# Patient Record
Sex: FEMALE | Race: WHITE | Employment: FULL TIME | URBAN - METROPOLITAN AREA
[De-identification: names, ages, dates, MRNs, and addresses within clinical notes are randomized per-mention and may not be internally consistent; named-entity substitution may affect disease eponyms.]

---

## 2018-11-16 ENCOUNTER — HOSPITAL ENCOUNTER (OUTPATIENT)
Age: 33
Discharge: HOME | End: 2018-11-18
Payer: COMMERCIAL

## 2018-11-16 PROCEDURE — 88305 TISSUE EXAM BY PATHOLOGIST: CPT

## 2018-12-18 ENCOUNTER — APPOINTMENT (OUTPATIENT)
Dept: GENERAL RADIOLOGY | Age: 33
End: 2018-12-18
Payer: MEDICARE

## 2018-12-18 ENCOUNTER — HOSPITAL ENCOUNTER (EMERGENCY)
Age: 33
Discharge: HOME | End: 2018-12-18
Payer: MEDICARE

## 2018-12-18 ENCOUNTER — APPOINTMENT (OUTPATIENT)
Dept: CT IMAGING | Age: 33
End: 2018-12-18
Payer: MEDICARE

## 2018-12-18 VITALS
TEMPERATURE: 98.7 F | BODY MASS INDEX: 39.99 KG/M2 | RESPIRATION RATE: 16 BRPM | HEART RATE: 65 BPM | OXYGEN SATURATION: 100 % | WEIGHT: 240 LBS | HEIGHT: 65 IN | SYSTOLIC BLOOD PRESSURE: 107 MMHG | DIASTOLIC BLOOD PRESSURE: 81 MMHG

## 2018-12-18 DIAGNOSIS — S73.102A HIP SPRAIN, LEFT, INITIAL ENCOUNTER: ICD-10-CM

## 2018-12-18 DIAGNOSIS — R07.89 LEFT-SIDED CHEST WALL PAIN: ICD-10-CM

## 2018-12-18 DIAGNOSIS — S16.1XXA STRAIN OF NECK MUSCLE, INITIAL ENCOUNTER: ICD-10-CM

## 2018-12-18 DIAGNOSIS — S09.90XA CLOSED HEAD INJURY, INITIAL ENCOUNTER: ICD-10-CM
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
## 2018-12-18 DIAGNOSIS — S43.402A SPRAIN OF LEFT SHOULDER, UNSPECIFIED SHOULDER SPRAIN TYPE, INITIAL ENCOUNTER: ICD-10-CM

## 2018-12-18 PROCEDURE — 72125 CT NECK SPINE W/O DYE: CPT

## 2018-12-18 PROCEDURE — 71101 X-RAY EXAM UNILAT RIBS/CHEST: CPT

## 2018-12-18 PROCEDURE — 73502 X-RAY EXAM HIP UNI 2-3 VIEWS: CPT

## 2018-12-18 PROCEDURE — 99285 EMERGENCY DEPT VISIT HI MDM: CPT

## 2018-12-18 PROCEDURE — 6370000000 HC RX 637 (ALT 250 FOR IP): Performed by: PHYSICIAN ASSISTANT

## 2018-12-18 PROCEDURE — 73552 X-RAY EXAM OF FEMUR 2/>: CPT

## 2018-12-18 PROCEDURE — 73030 X-RAY EXAM OF SHOULDER: CPT

## 2018-12-18 PROCEDURE — 70450 CT HEAD/BRAIN W/O DYE: CPT

## 2018-12-18 RX ORDER — NAPROXEN 500 MG/1
500 TABLET ORAL 2 TIMES DAILY
Qty: 30 TABLET | Refills: 0 | Status: SHIPPED | OUTPATIENT
Start: 2018-12-18 | End: 2021-02-25

## 2018-12-18 RX ORDER — LORAZEPAM 1 MG/1
1 TABLET ORAL ONCE
Status: COMPLETED | OUTPATIENT
Start: 2018-12-18 | End: 2018-12-18

## 2018-12-18 RX ORDER — CYCLOBENZAPRINE HCL 10 MG
10 TABLET ORAL NIGHTLY PRN
Qty: 20 TABLET | Refills: 0 | Status: SHIPPED | OUTPATIENT
Start: 2018-12-18 | End: 2018-12-28

## 2018-12-18 RX ADMIN — LORAZEPAM 1 MG: 1 TABLET ORAL at 14:24

## 2018-12-18 ASSESSMENT — PAIN DESCRIPTION - LOCATION: LOCATION: HEAD

## 2018-12-18 ASSESSMENT — PAIN SCALES - GENERAL: PAINLEVEL_OUTOF10: 8
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
## 2019-04-05 ENCOUNTER — HOSPITAL ENCOUNTER (EMERGENCY)
Age: 34
Discharge: HOME | End: 2019-04-05
Payer: MEDICARE

## 2019-04-05 VITALS
SYSTOLIC BLOOD PRESSURE: 120 MMHG | HEIGHT: 64 IN | DIASTOLIC BLOOD PRESSURE: 82 MMHG | HEART RATE: 88 BPM | TEMPERATURE: 98.2 F | WEIGHT: 237 LBS | RESPIRATION RATE: 14 BRPM | BODY MASS INDEX: 40.46 KG/M2 | OXYGEN SATURATION: 98 %

## 2019-04-05 DIAGNOSIS — R11.2 NAUSEA VOMITING AND DIARRHEA: Primary | ICD-10-CM

## 2019-04-05 DIAGNOSIS — R19.7 NAUSEA VOMITING AND DIARRHEA: Primary | ICD-10-CM

## 2019-04-05 LAB
ALBUMIN SERPL-MCNC: 4.4 G/DL (ref 3.5–5.2)
ALP BLD-CCNC: 86 U/L (ref 35–104)
ALT SERPL-CCNC: 16 U/L (ref 0–32)
ANION GAP SERPL CALCULATED.3IONS-SCNC: 14 MMOL/L (ref 7–16)
AST SERPL-CCNC: 30 U/L (ref 0–31)
BACTERIA: ABNORMAL /HPF
BASOPHILS ABSOLUTE: 0.02 E9/L (ref 0–0.2)
BASOPHILS RELATIVE PERCENT: 0.2 % (ref 0–2)
BILIRUB SERPL-MCNC: 0.8 MG/DL (ref 0–1.2)
BILIRUBIN URINE: NEGATIVE
BLOOD, URINE: NEGATIVE
BUN BLDV-MCNC: 11 MG/DL (ref 6–20)
CALCIUM SERPL-MCNC: 9.2 MG/DL (ref 8.6–10.2)
CHLORIDE BLD-SCNC: 101 MMOL/L (ref 98–107)
CLARITY: CLEAR
CO2: 25 MMOL/L (ref 22–29)
COLOR: YELLOW
CREAT SERPL-MCNC: 0.8 MG/DL (ref 0.5–1)
EOSINOPHILS ABSOLUTE: 0.11 E9/L (ref 0.05–0.5)
EOSINOPHILS RELATIVE PERCENT: 1.2 % (ref 0–6)
EPITHELIAL CELLS, UA: ABNORMAL /HPF
GFR AFRICAN AMERICAN: >60
GFR NON-AFRICAN AMERICAN: >60 ML/MIN/1.73
GLUCOSE BLD-MCNC: 101 MG/DL (ref 74–99)
GLUCOSE URINE: NEGATIVE MG/DL
HCG, URINE, POC: NEGATIVE
HCT VFR BLD CALC: 42.3 % (ref 34–48)
HEMOGLOBIN: 14 G/DL (ref 11.5–15.5)
IMMATURE GRANULOCYTES #: 0.03 E9/L
IMMATURE GRANULOCYTES %: 0.3 % (ref 0–5)
KETONES, URINE: NEGATIVE MG/DL
LACTIC ACID: 1.7 MMOL/L (ref 0.5–2.2)
LEUKOCYTE ESTERASE, URINE: ABNORMAL
LIPASE: 22 U/L (ref 13–60)
LYMPHOCYTES ABSOLUTE: 0.66 E9/L (ref 1.5–4)
LYMPHOCYTES RELATIVE PERCENT: 7.5 % (ref 20–42)
Lab: NORMAL
MCH RBC QN AUTO: 27.4 PG (ref 26–35)
MCHC RBC AUTO-ENTMCNC: 33.1 % (ref 32–34.5)
MCV RBC AUTO: 82.8 FL (ref 80–99.9)
MONOCYTES ABSOLUTE: 0.29 E9/L (ref 0.1–0.95)
MONOCYTES RELATIVE PERCENT: 3.3 % (ref 2–12)
NEGATIVE QC PASS/FAIL: NORMAL
NEUTROPHILS ABSOLUTE: 7.73 E9/L (ref 1.8–7.3)
NEUTROPHILS RELATIVE PERCENT: 87.5 % (ref 43–80)
NITRITE, URINE: NEGATIVE
PDW BLD-RTO: 12.9 FL (ref 11.5–15)
PH UA: 8.5 (ref 5–9)
PLATELET # BLD: 208 E9/L (ref 130–450)
PMV BLD AUTO: 10.8 FL (ref 7–12)
POSITIVE QC PASS/FAIL: NORMAL
POTASSIUM SERPL-SCNC: 5 MMOL/L (ref 3.5–5)
PROTEIN UA: NEGATIVE MG/DL
RBC # BLD: 5.11 E12/L (ref 3.5–5.5)
RBC UA: ABNORMAL /HPF (ref 0–2)
SODIUM BLD-SCNC: 140 MMOL/L (ref 132–146)
SPECIFIC GRAVITY UA: 1.01 (ref 1–1.03)
TOTAL PROTEIN: 7.8 G/DL (ref 6.4–8.3)
UROBILINOGEN, URINE: 0.2 E.U./DL
WBC # BLD: 8.8 E9/L (ref 4.5–11.5)
WBC UA: ABNORMAL /HPF (ref 0–5)

## 2019-04-05 PROCEDURE — 85025 COMPLETE CBC W/AUTO DIFF WBC: CPT

## 2019-04-05 PROCEDURE — 36415 COLL VENOUS BLD VENIPUNCTURE: CPT

## 2019-04-05 PROCEDURE — 96375 TX/PRO/DX INJ NEW DRUG ADDON: CPT

## 2019-04-05 PROCEDURE — 2500000003 HC RX 250 WO HCPCS: Performed by: NURSE PRACTITIONER

## 2019-04-05 PROCEDURE — 2580000003 HC RX 258: Performed by: NURSE PRACTITIONER

## 2019-04-05 PROCEDURE — 81001 URINALYSIS AUTO W/SCOPE: CPT

## 2019-04-05 PROCEDURE — 83690 ASSAY OF LIPASE: CPT

## 2019-04-05 PROCEDURE — 6360000002 HC RX W HCPCS: Performed by: NURSE PRACTITIONER

## 2019-04-05 PROCEDURE — 99284 EMERGENCY DEPT VISIT MOD MDM: CPT

## 2019-04-05 PROCEDURE — 83605 ASSAY OF LACTIC ACID: CPT

## 2019-04-05 PROCEDURE — 80053 COMPREHEN METABOLIC PANEL: CPT

## 2019-04-05 PROCEDURE — 96374 THER/PROPH/DIAG INJ IV PUSH: CPT

## 2019-04-05 RX ORDER — 0.9 % SODIUM CHLORIDE 0.9 %
1000 INTRAVENOUS SOLUTION INTRAVENOUS ONCE
Status: COMPLETED | OUTPATIENT
Start: 2019-04-05 | End: 2019-04-05

## 2019-04-05 RX ORDER — DICYCLOMINE HYDROCHLORIDE 10 MG/1
20 CAPSULE ORAL 4 TIMES DAILY PRN
Qty: 20 CAPSULE | Refills: 0 | Status: SHIPPED | OUTPATIENT
Start: 2019-04-05 | End: 2021-02-25

## 2019-04-05 RX ORDER — ONDANSETRON 2 MG/ML
4 INJECTION INTRAMUSCULAR; INTRAVENOUS ONCE
Status: COMPLETED | OUTPATIENT
Start: 2019-04-05 | End: 2019-04-05

## 2019-04-05 RX ORDER — ONDANSETRON 4 MG/1
4 TABLET, ORALLY DISINTEGRATING ORAL EVERY 8 HOURS PRN
Qty: 10 TABLET | Refills: 0 | Status: SHIPPED | OUTPATIENT
Start: 2019-04-05 | End: 2021-02-25

## 2019-04-05 RX ADMIN — SODIUM CHLORIDE 1000 ML: 9 INJECTION, SOLUTION INTRAVENOUS at 12:22

## 2019-04-05 RX ADMIN — FAMOTIDINE 20 MG: 10 INJECTION, SOLUTION INTRAVENOUS at 12:23

## 2019-04-05 RX ADMIN — ONDANSETRON HYDROCHLORIDE 4 MG: 2 SOLUTION INTRAMUSCULAR; INTRAVENOUS at 12:24

## 2019-04-05 NOTE — ED PROVIDER NOTES
Independent [facility]    Department of Emergency Medicine   ED  Provider Note  Admit Date/RoomTime: 4/5/2019 11:07 AM  ED Room: 37/37  MRN: [id number]  Chief Complaint       Emesis (started this AM); Diarrhea; and Fatigue    History of Present Illness   Source of history provided by:  patient. History/Exam Limitations: none. Davi Chambers is a 36 y.o. old female who has a past medical history of:   Past Medical History:   Diagnosis Date    Anxiety     Depression     Pulmonary embolism ([address]     again 25    presents to the emergency department by private vehicle, for complaints of sudden onset burning, sharp pain in the epigastrium and in the periumbilical area without radiation which began acutely at 0400 this morning. There has been no similar episodes in the past .  Since onset the symptoms have been worsening. The pain is associated with chills, diarrhea and vomiting. The pain is aggravated by laying flat/supine and relieved by nothing. There has been NO back pain, cloudy urine, dysuria, headache or vaginal discharge. She also denies chest pain, shortness of breath, dizziness, and syncope. Patient states she last ate chicken that she made at home approximately 10 hours prior to acute onset of symptoms. Patient is extremely tearful and did not take any OTC medications for her symptoms. ROS   Pertinent positives and negatives are stated within HPI, all other systems reviewed and are negative. History reviewed. No pertinent surgical history. Social History:  reports that she has never smoked. She has never used smokeless tobacco. She reports that she does not drink alcohol or use drugs. Family History: family history is not on file. Allergies: Patient has no known allergies.     Physical Exam           ED Triage Vitals   BP Temp Temp src Pulse Resp SpO2 Height Weight   04/05/19 1105 04/05/19 1041 -- 04/05/19 1041 04/05/19 1105 04/05/19 1041 04/05/19 1105 04/05/19 1105   116/87 97.6 °F (36.4 °C)  93 12 97 % 5' 4\" (1.626 m) 237 lb (107.5 kg)      Oxygen Saturation Interpretation: Normal.    · General Appearance/Constitutional:  Alert, development consistent with age. · HEENT:  NC/NT. PERRLA. Airway patent. · Neck:  Supple. No lymphadenopathy. · Respiratory:  No retractions. Lungs Clear to auscultation and breath sounds equal.  · CV:  Regular rate and rhythm. · GI:  General Appearance: Obese, no abd scars. Bowel sounds: normal bowel sounds. Distension:  None. Tenderness: no rebound tenderness, no guarding. There is pain in the epigastric and periumbilical regions, but there is no increased pain on palpation of these or other areas of the abdomen. Liver: non-palpable and non-tender. Spleen:  non-palpable and non-tender. Pulsatile Mass: absent. Hernia:  no inguinal or femoral hernias noted. · Back: CVA Tenderness: No.   Integument:  Normal turgor. Warm, dry, without visible rash, unless noted elsewhere. · Lymphatics: No edema, cap.refill <3sec. Neurological:  GCS 15, Face is symmetric (VII), EOMs are intact (III, IV, VI), pupils are equal and reactive (II, III), tongue is midline (XII). The patient is walking in a smooth balanced and coordinated fashion w/o bumping or walking into anything (vision), talking w/ appropriate content and fluency, is fully attentive, and provided a spontaneous coherent history.       Lab / Imaging Results   (All laboratory and radiology results have been personally reviewed by myself)  Labs:  Results for orders placed or performed during the hospital encounter of 04/05/19   CBC Auto Differential   Result Value Ref Range    WBC 8.8 4.5 - 11.5 E9/L    RBC 5.11 3.50 - 5.50 E12/L    Hemoglobin 14.0 11.5 - 15.5 g/dL    Hematocrit 42.3 34.0 - 48.0 %    MCV 82.8 80.0 - 99.9 fL    MCH 27.4 26.0 - 35.0 pg    MCHC 33.1 32.0 - 34.5 %    RDW 12.9 11.5 - 15.0 fL    Platelets 765 501 - 903 E9/L    MPV 10.8 7.0 - 12.0 fL    Neutrophils % 87.5 (H) 43.0 - 80.0 %    Immature Granulocytes % 0.3 0.0 - 5.0 %    Lymphocytes % 7.5 (L) 20.0 - 42.0 %    Monocytes % 3.3 2.0 - 12.0 %    Eosinophils % 1.2 0.0 - 6.0 %    Basophils % 0.2 0.0 - 2.0 %    Neutrophils # 7.73 (H) 1.80 - 7.30 E9/L    Immature Granulocytes # 0.03 E9/L    Lymphocytes # 0.66 (L) 1.50 - 4.00 E9/L    Monocytes # 0.29 0.10 - 0.95 E9/L    Eosinophils # 0.11 0.05 - 0.50 E9/L    Basophils # 0.02 0.00 - 0.20 E9/L   Comprehensive Metabolic Panel   Result Value Ref Range    Sodium 140 132 - 146 mmol/L    Potassium 5.0 3.5 - 5.0 mmol/L    Chloride 101 98 - 107 mmol/L    CO2 25 22 - 29 mmol/L    Anion Gap 14 7 - 16 mmol/L    Glucose 101 (H) 74 - 99 mg/dL    BUN 11 6 - 20 mg/dL    CREATININE 0.8 0.5 - 1.0 mg/dL    GFR Non-African American >60 >=60 mL/min/1.73    GFR African American >60     Calcium 9.2 8.6 - 10.2 mg/dL    Total Protein 7.8 6.4 - 8.3 g/dL    Alb 4.4 3.5 - 5.2 g/dL    Total Bilirubin 0.8 0.0 - 1.2 mg/dL    Alkaline Phosphatase 86 35 - 104 U/L    ALT 16 0 - 32 U/L    AST 30 0 - 31 U/L   Lactic Acid, Plasma   Result Value Ref Range    Lactic Acid 1.7 0.5 - 2.2 mmol/L   Lipase   Result Value Ref Range    Lipase 22 13 - 60 U/L   Urinalysis   Result Value Ref Range    Color, UA Yellow Straw/Yellow    Clarity, UA Clear Clear    Glucose, Ur Negative Negative mg/dL    Bilirubin Urine Negative Negative    Ketones, Urine Negative Negative mg/dL    Specific Gravity, UA 1.010 1.005 - 1.030    Blood, Urine Negative Negative    pH, UA 8.5 5.0 - 9.0    Protein, UA Negative Negative mg/dL    Urobilinogen, Urine 0.2 <2.0 E.U./dL    Nitrite, Urine Negative Negative    Leukocyte Esterase, Urine TRACE (A) Negative   Microscopic Urinalysis   Result Value Ref Range    WBC, UA 0-1 0 - 5 /HPF    RBC, UA NONE 0 - 2 /HPF    Epi Cells MODERATE /HPF    Bacteria, UA FEW (A) /HPF   POC Pregnancy Urine   Result Value Ref Range    HCG, Urine, POC Negative Negative    Lot Number [id number] Every effort was made to ensure accuracy; however, inadvertent computerized transcription errors may be present.   END OF ED PROVIDER NOTE     Alma Lara, APRN - FAISAL  04/05/19 7770

## 2019-04-05 NOTE — ED NOTES
Bed: 37  Expected date:   Expected time:   Means of arrival:   Comments:  Franklin Hdez, JESUS  04/05/19 8851

## 2021-02-20 ENCOUNTER — APPOINTMENT (OUTPATIENT)
Dept: GENERAL RADIOLOGY | Age: 36
End: 2021-02-20
Payer: MEDICAID

## 2021-02-20 ENCOUNTER — HOSPITAL ENCOUNTER (EMERGENCY)
Age: 36
Discharge: HOME | End: 2021-02-20
Payer: MEDICAID

## 2021-02-20 VITALS
WEIGHT: 237 LBS | DIASTOLIC BLOOD PRESSURE: 70 MMHG | BODY MASS INDEX: 40.68 KG/M2 | OXYGEN SATURATION: 98 % | TEMPERATURE: 97.8 F | SYSTOLIC BLOOD PRESSURE: 132 MMHG | RESPIRATION RATE: 17 BRPM | HEART RATE: 87 BPM

## 2021-02-20 DIAGNOSIS — S52.602A CLOSED FRACTURE OF DISTAL ENDS OF LEFT RADIUS AND ULNA, INITIAL ENCOUNTER: Primary | ICD-10-CM

## 2021-02-20 DIAGNOSIS — S52.502A CLOSED FRACTURE OF DISTAL ENDS OF LEFT RADIUS AND ULNA, INITIAL ENCOUNTER: Primary | ICD-10-CM

## 2021-02-20 PROCEDURE — 6370000000 HC RX 637 (ALT 250 FOR IP): Performed by: NURSE PRACTITIONER

## 2021-02-20 PROCEDURE — 73110 X-RAY EXAM OF WRIST: CPT

## 2021-02-20 PROCEDURE — 2500000003 HC RX 250 WO HCPCS: Performed by: NURSE PRACTITIONER

## 2021-02-20 PROCEDURE — 96372 THER/PROPH/DIAG INJ SC/IM: CPT

## 2021-02-20 PROCEDURE — 6370000000 HC RX 637 (ALT 250 FOR IP): Performed by: PHYSICIAN ASSISTANT

## 2021-02-20 PROCEDURE — 6360000002 HC RX W HCPCS: Performed by: STUDENT IN AN ORGANIZED HEALTH CARE EDUCATION/TRAINING PROGRAM

## 2021-02-20 PROCEDURE — 99285 EMERGENCY DEPT VISIT HI MDM: CPT

## 2021-02-20 PROCEDURE — 73130 X-RAY EXAM OF HAND: CPT

## 2021-02-20 RX ORDER — FENTANYL CITRATE 50 UG/ML
50 INJECTION, SOLUTION INTRAMUSCULAR; INTRAVENOUS ONCE
Status: COMPLETED | OUTPATIENT
Start: 2021-02-20 | End: 2021-02-20

## 2021-02-20 RX ORDER — MORPHINE SULFATE 4 MG/ML
8 INJECTION, SOLUTION INTRAMUSCULAR; INTRAVENOUS ONCE
Status: DISCONTINUED | OUTPATIENT
Start: 2021-02-20 | End: 2021-02-20

## 2021-02-20 RX ORDER — ONDANSETRON 4 MG/1
4 TABLET, ORALLY DISINTEGRATING ORAL ONCE
Status: DISCONTINUED | OUTPATIENT
Start: 2021-02-20 | End: 2021-02-21 | Stop reason: HOSPADM

## 2021-02-20 RX ORDER — LIDOCAINE HYDROCHLORIDE 10 MG/ML
20 INJECTION, SOLUTION INFILTRATION; PERINEURAL ONCE
Status: COMPLETED | OUTPATIENT
Start: 2021-02-20 | End: 2021-02-20

## 2021-02-20 RX ORDER — OXYCODONE HYDROCHLORIDE AND ACETAMINOPHEN 5; 325 MG/1; MG/1
1 TABLET ORAL EVERY 8 HOURS PRN
Qty: 18 TABLET | Refills: 0 | Status: SHIPPED | OUTPATIENT
Start: 2021-02-20 | End: 2021-02-23

## 2021-02-20 RX ORDER — OXYCODONE HYDROCHLORIDE AND ACETAMINOPHEN 5; 325 MG/1; MG/1
1 TABLET ORAL ONCE
Status: COMPLETED | OUTPATIENT
Start: 2021-02-20 | End: 2021-02-20

## 2021-02-20 RX ADMIN — OXYCODONE AND ACETAMINOPHEN 1 TABLET: 5; 325 TABLET ORAL at 21:46

## 2021-02-20 RX ADMIN — OXYCODONE AND ACETAMINOPHEN 1 TABLET: 5; 325 TABLET ORAL at 19:15

## 2021-02-20 RX ADMIN — LIDOCAINE HYDROCHLORIDE 20 ML: 10 INJECTION, SOLUTION INFILTRATION; PERINEURAL at 20:44

## 2021-02-20 RX ADMIN — FENTANYL CITRATE 50 MCG: 50 INJECTION, SOLUTION INTRAMUSCULAR; INTRAVENOUS at 20:24

## 2021-02-20 ASSESSMENT — PAIN SCALES - GENERAL
PAINLEVEL_OUTOF10: 7
PAINLEVEL_OUTOF10: 6
PAINLEVEL_OUTOF10: 10

## 2021-02-21 NOTE — CONSULTS
Department of Orthopedic Surgery  Resident Consult Note          Reason for Consult:   Left Wrist Pain    HISTORY OF PRESENT ILLNESS:       Patient is a 30 y.o. female who presents with wrist pain after fall while sledding. Patient is right-hand dominant female. She states that after she fell she had wrist pain that was not severe until she started to try to move it. She does endorse some tingling to the distal thumb. All of the other fingers sensation is intact per her account. Patient states it was more painful after they moved her for xray. She endorses radiating pain up into her elbow into her shoulder. She has no pain with movement of the elbow. Denies any other pains elsewhere. Denies any other orthopedic complaints at this time. Of note, she is right had dominant and works in a . She is active. Past Medical History:        Diagnosis Date    Anxiety     Depression     Pulmonary embolism ([facility] [address]     again 25     Past Surgical History:    History reviewed. No pertinent surgical history. Current Medications:   Current Facility-Administered Medications: ondansetron (ZOFRAN-ODT) disintegrating tablet 4 mg, 4 mg, Oral, Once  lidocaine 1 % injection 20 mL, 20 mL, Intradermal, Once  fentaNYL (SUBLIMAZE) injection 50 mcg, 50 mcg, Intramuscular, Once  Allergies:  Patient has no known allergies. Social History:   TOBACCO:   reports that she has never smoked. She has never used smokeless tobacco.  ETOH:   reports no history of alcohol use. DRUGS:   reports no history of drug use. ACTIVITIES OF DAILY LIVING:    OCCUPATION:    Family History:   History reviewed. No pertinent family history.     REVIEW OF SYSTEMS:  CONSTITUTIONAL:  negative for  fevers, chills  EYES:  negative for acute vision changes  HEENT:  negative for acute hearing changes  RESPIRATORY:  negative for SOB  CARDIOVASCULAR:  negative for acute chest pains  GASTROINTESTINAL:  negative for nausea, vomiting  HEMATOLOGIC/LYMPHATIC: is an acute intra-articular distal radius fracture with mild dorsal angulation. There is an associated ulnar styloid fracture. No carpal bone fractures noted. Mild DRUJ widening on these projections. IMPRESSION:  ·  Closed, Left Distal Radius Fx    PLAN:  After informed consent was verbally obtained. Distal radius underwent closed reduction with application of well-padded sugar tong splint, neurovascular status was unchanged. Currently no concern for acute carpal tunnel syndrome. Non-weight bearing to Left Upper Extermity  Post splinting xrays  Pain medication and Vitamin C  Strict Ice and elevation to  leftUE  Signs and symptoms of worsening or emergent care explained at length to the patient. She will return if symptoms worsen. Surgical expectations discussed with patient. Follow up as soon as possible with ortho trauma. Call for appointment. · Discuss with Dr. Kee Treadwell.

## 2021-02-21 NOTE — ED NOTES
Pt alert and oriented x4. Speech clear. Respirations easy/unlabored. Skin warm/dry. Appropriate color. No signs of acute distress noted. Pt/family teaching provided; verbalized understanding. Pt stable for discharge.        Zelda Buchanan RN  02/20/21 5101

## 2021-02-22 ENCOUNTER — TELEPHONE (OUTPATIENT)
Dept: ADMINISTRATIVE | Age: 36
End: 2021-02-22

## 2021-02-22 ENCOUNTER — TELEPHONE (OUTPATIENT)
Dept: ORTHOPEDIC SURGERY | Age: 36
End: 2021-02-22

## 2021-02-22 NOTE — TELEPHONE ENCOUNTER
Call placed to patient to schedule ED follow up visit. Appointment scheduled at this time. Directions provided to patient. Encouraged to call with further questions or concerns.     Future Appointments   Date Time Provider Emily Mclain   2/25/2021  8:15  [facility]

## 2021-02-22 NOTE — TELEPHONE ENCOUNTER
Patient seen at [facility] on 2/20 fror left wrist fracture. She was advised to call and make appointment to  f/u with Dr Caty Edwards  By 2/22. Please reach back with patient to schedule appointment.

## 2021-02-22 NOTE — TELEPHONE ENCOUNTER
----- Message from Heriberto Moeller PA-C sent at 2/22/2021  7:25 AM EST -----  Regarding:  This patient to be seen with [facility] this week  Mariolashahid Araiza  Female, 30 y.o., 1985  MRN:   [id number]    Left Distal Radius fracture-- surgery set up

## 2021-02-25 ENCOUNTER — HOSPITAL ENCOUNTER (OUTPATIENT)
Age: 36
Discharge: HOME | End: 2021-02-27
Payer: MEDICAID

## 2021-02-25 ENCOUNTER — OFFICE VISIT (OUTPATIENT)
Dept: ORTHOPEDIC SURGERY | Age: 36
End: 2021-02-25
Payer: MEDICAID

## 2021-02-25 VITALS — TEMPERATURE: 98.2 F

## 2021-02-25 DIAGNOSIS — Z11.59 SCREENING FOR VIRAL DISEASE: ICD-10-CM

## 2021-02-25 DIAGNOSIS — S52.532A CLOSED COLLES' FRACTURE OF LEFT RADIUS, INITIAL ENCOUNTER: ICD-10-CM

## 2021-02-25 DIAGNOSIS — S52.572A OTHER CLOSED INTRA-ARTICULAR FRACTURE OF DISTAL END OF LEFT RADIUS, INITIAL ENCOUNTER: Primary | ICD-10-CM

## 2021-02-25 PROCEDURE — 99204 OFFICE O/P NEW MOD 45 MIN: CPT | Performed by: ORTHOPAEDIC SURGERY

## 2021-02-25 PROCEDURE — 99202 OFFICE O/P NEW SF 15 MIN: CPT

## 2021-02-25 PROCEDURE — 1036F TOBACCO NON-USER: CPT | Performed by: ORTHOPAEDIC SURGERY

## 2021-02-25 PROCEDURE — G8484 FLU IMMUNIZE NO ADMIN: HCPCS | Performed by: ORTHOPAEDIC SURGERY

## 2021-02-25 PROCEDURE — U0003 INFECTIOUS AGENT DETECTION BY NUCLEIC ACID (DNA OR RNA); SEVERE ACUTE RESPIRATORY SYNDROME CORONAVIRUS 2 (SARS-COV-2) (CORONAVIRUS DISEASE [COVID-19]), AMPLIFIED PROBE TECHNIQUE, MAKING USE OF HIGH THROUGHPUT TECHNOLOGIES AS DESCRIBED BY CMS-2020-01-R: HCPCS

## 2021-02-25 PROCEDURE — G8427 DOCREV CUR MEDS BY ELIG CLIN: HCPCS | Performed by: ORTHOPAEDIC SURGERY

## 2021-02-25 PROCEDURE — G8417 CALC BMI ABV UP PARAM F/U: HCPCS | Performed by: ORTHOPAEDIC SURGERY

## 2021-02-25 RX ORDER — OXYCODONE HYDROCHLORIDE AND ACETAMINOPHEN 5; 325 MG/1; MG/1
1 TABLET ORAL EVERY 6 HOURS PRN
COMMUNITY
End: 2021-02-25 | Stop reason: SDUPTHER

## 2021-02-25 RX ORDER — METHOCARBAMOL 750 MG/1
750 TABLET, FILM COATED ORAL 4 TIMES DAILY PRN
Qty: 40 TABLET | Refills: 0 | Status: SHIPPED
Start: 2021-02-25 | End: 2021-03-01 | Stop reason: ALTCHOICE

## 2021-02-25 RX ORDER — OXYCODONE HYDROCHLORIDE AND ACETAMINOPHEN 5; 325 MG/1; MG/1
1 TABLET ORAL EVERY 6 HOURS PRN
Qty: 28 TABLET | Refills: 0 | Status: ON HOLD
Start: 2021-03-01 | End: 2021-03-03 | Stop reason: HOSPADM

## 2021-02-25 RX ORDER — OLOPATADINE HYDROCHLORIDE 1 MG/ML
1 SOLUTION/ DROPS OPHTHALMIC DAILY
COMMUNITY
Start: 2020-11-27 | End: 2022-04-13 | Stop reason: ALTCHOICE

## 2021-02-25 RX ORDER — IBUPROFEN 200 MG
200 TABLET ORAL EVERY 6 HOURS PRN
COMMUNITY
End: 2022-03-13 | Stop reason: SDUPTHER

## 2021-02-25 NOTE — PROGRESS NOTES
Orthopaedic New Patient Note        Garrett León is a 30 y.o. female, her YOB: 1985 with the following history as recorded in [facility]:    Patient Active Problem List    Diagnosis Date Noted    Closed Colles' fracture of left radius 02/25/2021     Current Outpatient Medications   Medication Sig Dispense Refill    olopatadine (PATANOL) 0.1 % ophthalmic solution Place 1 drop into both eyes daily      ibuprofen (ADVIL;MOTRIN) 200 MG tablet Take 200 mg by mouth every 6 hours as needed for Pain (2 to 4 tabs Q6 PRN)      oxyCODONE-acetaminophen (PERCOCET) 5-325 MG per tablet Take 1 tablet by mouth every 6 hours as needed for Pain (PRN once daily due to work). No current facility-administered medications for this visit. Allergies: Patient has no known allergies. Past Medical History:   Diagnosis Date    Anxiety     Depression     Pulmonary embolism ([facility] [address]     again 25     History reviewed. No pertinent surgical history. History reviewed. No pertinent family history. Social History     Tobacco Use    Smoking status: Never Smoker    Smokeless tobacco: Never Used   Substance Use Topics    Alcohol use: No     Comment: social                           Review of Systems   Constitutional: Negative for fever and chills. HENT: Negative for ear pain, sore throat and sinus pressure. Eyes: Negative for pain, discharge and redness. Respiratory: Negative for cough, shortness of breath and wheezing. Cardiovascular: Negative for chest pain. Gastrointestinal: Negative for nausea, vomiting, diarrhea and abdominal distention. Genitourinary: Negative for dysuria and frequency. Musculoskeletal: Negative for back pain and arthralgias. All other systems reviewed and negative.     CC: Left wrist injury S: Shell Chance is a 30 y. o. who is here today for initial evaluation for her left wrist. DOI: 2/20/21, BEREKET: Mechanical fall. Patient is right-hand dominant. Was seen in ER on date of injury diagnosed with intra-articular left distal radius fracture, underwent closed reduction and sugar tong splint application. She presents today for initial follow-up. Did have some initial paresthesias to the hand. States these continue to be intermittent and denies numbness at this time. States she still has pain to the wrist, denies pain elsewhere. Denies any other injuries or regions of pain. She works at a  and continues to work. States she is not lifting anything at this time. Physical Exam  Temp 98.2 °F (36.8 °C) (Oral)   LMP 12/07/2020 Comment: very, very irregular  O:   CONSTITUTIONAL: awake, alert, cooperative, no apparent distress, and appears stated age  EYES: Lids and lashes normal, pupils equal, round and reactive to light, extra ocular muscles intact, sclera clear, conjunctiva normal  ENT: Normocephalic, without obvious abnormality, atraumatic, external ears without lesions, oral pharynx with moist mucus membranes  NECK: Trachea midline, skin normal  LUNGS: No increased work of breathing, good air exchange  CARDIOVASCULAR: 2+ pulses throughout and capillary Refill less than 2 seconds  ABDOMEN: soft, non-distended, non-tender and no rebound tenderness or guarding  NEUROLOGIC: Awake, alert, oriented to name, place and time. Cranial nerves II-XII are grossly intact. Motor is 5 out of 5 bilaterally. Sensory is intact.    Left Upper Extremity Exam:  Sugar tong splint is intact  Demonstrates good active ROM of digits, mild swelling to her fingers  No pain with passive ROM of fingers  Sensation intact to touch distally to all digits, states sensation intact to radial, ulnar, median nerve distributions to the hand  Fingers warm and perfused, brisk cap refill Demonstrates AIN, PIN, ulnar motor to the hand  No other tenderness about the arm to the shoulder elbow, pain focal to the wrist  Compartments supple throughout arm and forearm. Xrays Reviewed  Left distal radius fracture metadiaphyseal junction, initial dorsal angulation with comminuted, intra-articular extension, interval closed reduction    ASSESSMENT:    ICD-10-CM    1. Other closed intra-articular fracture of distal end of left radius, initial encounter  S52.572A        PLAN:   Had lengthy discussion with patient regarding their diagnosis, typical prognosis, and expected outcomes. We reviewed the possible complications from the injury itself despite treatment chosen. We also discussed treatment options including nonoperative managements versus surgical management, along with risks and benefits of each. Patient has elected for surgical management despite associated risks. OR Wednesday, 3/3/2021 for left distal radius intra-articular fracture open reduction internal fixation  Patient to maintain sugar tong splint, elevation, nonweightbearing affected extremity  PAT and Covid testing per protocol. I have explained the risks and complications of the recommended surgery with the patient at length, as well as discussed potential treatment alternatives including nonoperative management. These risks include but are not limited to death or complication from anesthesia, continued pain, stiffness, nerve tendon or vascular injury, infection, nonunion or malunion, symptomatic hardware or hardware failure, deep vein thrombosis or pulmonary embolism, unforeseen complications, and need for further surgery, etc.  Patient understood this, asked appropriate questions, which were all answered, and she has elected to proceed with the procedure.     Electronically Signed By  Kenneth Vences DO  2/25/21 NOTE: This report was transcribed using voice recognition software.  Every effort was made to ensure accuracy; however, inadvertent computerized transcription errors may be present

## 2021-02-25 NOTE — PROGRESS NOTES
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 Presents with sling and ED splint in good repair. Denies new injury or fall.

## 2021-02-25 NOTE — PATIENT INSTRUCTIONS
1. Your surgery is scheduled for Left Distal Radius Open Reduction with Internal Fixation on 3/3/2021 at 8:30 AM with Dr. Kenneth Vences, DO at the main [id number]  [address] on [facility] in [facility] . You will need to report to Preop area  that morning at 6:30 AM    2. You are having Outpatient surgery so you will be returning home the same day  3. Preadmission Testing (PAT) department at [facility] will contact you with all the details prior to surgery. 4. Nothing to eat or drink after midnight the night before surgery. You may take a pain pill and any other medicine PAT instructs you to take with small sip of water if needed. 5. Keep splint clean and dry. Do not remove or get wet. 6. Continue with ice and elevation to reduce swelling  7. No weight bearing left upper extremity, use assistive devices  8. Take pain medicine as instructed  9. Call office with any question or concerns: [phone]  28. Hold ASA the day of surgery. Hold all NSAIDs 3 days prior to surgery    All surgical patients will be gradually tapered off narcotic pain medication post operatively, this office will not continue narcotics longer than 6 weeks from date of surgery. If narcotics are required longer than this time period without objective finding you will be referred to pain management. Open Reduction With Internal Fixation for Limb Fracture: Before Your Surgery    What is open reduction with internal fixation? Open reduction with internal fixation is a type of surgery to fix a broken (fractured) bone. The doctor makes a cut, called an incision, in the skin over the bone. The doctor then moves the pieces of bone back into the normal position. This is called open reduction. The doctor may use special screws, pins, plates, or rods to hold the bone in place while it heals. This is called internal fixation. These devices may stay in your body from now on. The doctor closes the incision with stitches. You will have a scar, but it will fade with time. You may spend from a few hours to a few days in the hospital. This depends on how serious your injury is. It usually takes 6 to 12 weeks for a broken bone to heal.    How soon you can go back to work and your normal routine depends on your job. It also depends on how long it takes your bone to heal. For example, if you have a broken leg and you sit at work, you may be able to go back in 1 to 2 weeks. But if your job requires you to walk or stand a lot, you may need to wait until your bone has healed. OUTPATIENT ORTHOPEDIC SURGERY  PRE-OP COVID TESTING    ? We need to have COVID-19 Testing done 4 days (not including Sunday) prior to your scheduled surgery    ? After your testing is completed you will need to Self Quarantine until date of surgery    ? If your surgery is scheduled for:  Wednesday- Testing needs to be done Friday    Locations and hours are listed below: These sites will not test anyone without a physician order. The order can be in [facility] or can be a paper order. ? [facility]. Hours of Operation  Monday  Friday 6:00am  2:30pm.   ? [address] [facility], [address] [place] [address].  Hours of Operation  Monday  Friday 6:00am  2:30pm. ( [place] [id number]) ? [address] [facility], [place], [address] [place] [address]. Hours of Operation  Monday  Friday 6:00am  2:30pm.     ? You will follow white signs that say SURGERY TESTING  ? Please bring a valid photo ID with you  ? Please bring order for COVID 19 test with you  ?  Pre-Admission Testing will also contact you to review COVID 19 testing and protocol

## 2021-02-26 ENCOUNTER — PREP FOR PROCEDURE (OUTPATIENT)
Dept: ORTHOPEDIC SURGERY | Age: 36
End: 2021-02-26

## 2021-02-26 RX ORDER — SODIUM CHLORIDE, SODIUM LACTATE, POTASSIUM CHLORIDE, CALCIUM CHLORIDE 600; 310; 30; 20 MG/100ML; MG/100ML; MG/100ML; MG/100ML
INJECTION, SOLUTION INTRAVENOUS CONTINUOUS
Status: CANCELLED | OUTPATIENT
Start: 2021-02-26

## 2021-02-26 RX ORDER — SODIUM CHLORIDE 0.9 % (FLUSH) 0.9 %
10 SYRINGE (ML) INJECTION PRN
Status: CANCELLED | OUTPATIENT
Start: 2021-02-26

## 2021-02-26 RX ORDER — SODIUM CHLORIDE 0.9 % (FLUSH) 0.9 %
10 SYRINGE (ML) INJECTION EVERY 12 HOURS SCHEDULED
Status: CANCELLED | OUTPATIENT
Start: 2021-02-26

## 2021-02-26 NOTE — H&P
Orthopaedic New Patient Note         Carie Marin is a 30 y.o. female, her YOB: 1985 with the following history as recorded in [facility]:          Patient Active Problem List     Diagnosis Date Noted    Closed Colles' fracture of left radius 02/25/2021      Current Facility-Administered Medications          Current Outpatient Medications   Medication Sig Dispense Refill    olopatadine (PATANOL) 0.1 % ophthalmic solution Place 1 drop into both eyes daily        ibuprofen (ADVIL;MOTRIN) 200 MG tablet Take 200 mg by mouth every 6 hours as needed for Pain (2 to 4 tabs Q6 PRN)        oxyCODONE-acetaminophen (PERCOCET) 5-325 MG per tablet Take 1 tablet by mouth every 6 hours as needed for Pain (PRN once daily due to work).          No current facility-administered medications for this visit.          Allergies: Patient has no known allergies. Past Medical History        Past Medical History:   Diagnosis Date    Anxiety      Depression      Pulmonary embolism ([facility] [address]       again 25         Past Surgical History   History reviewed. No pertinent surgical history. Family History   History reviewed. No pertinent family history. Social History            Tobacco Use    Smoking status: Never Smoker    Smokeless tobacco: Never Used   Substance Use Topics    Alcohol use: No       Comment: social                            Review of Systems   Constitutional: Negative for fever and chills. HENT: Negative for ear pain, sore throat and sinus pressure. Eyes: Negative for pain, discharge and redness. Respiratory: Negative for cough, shortness of breath and wheezing. Cardiovascular: Negative for chest pain. Gastrointestinal: Negative for nausea, vomiting, diarrhea and abdominal distention. Genitourinary: Negative for dysuria and frequency. Musculoskeletal: Negative for back pain and arthralgias.    All other systems reviewed and negative.     CC: Left wrist injury     S: Carie Marin is a 30 y. o. who is here today for initial evaluation for her left wrist. DOI: 2/20/21, BEREKET: Mechanical fall. Patient is right-hand dominant. Was seen in ER on date of injury diagnosed with intra-articular left distal radius fracture, underwent closed reduction and sugar tong splint application. She presents today for initial follow-up. Did have some initial paresthesias to the hand. States these continue to be intermittent and denies numbness at this time. States she still has pain to the wrist, denies pain elsewhere. Denies any other injuries or regions of pain. She works at a  and continues to work. States she is not lifting anything at this time.     Physical Exam  Temp 98.2 °F (36.8 °C) (Oral)   LMP 12/07/2020 Comment: very, very irregular  O:   CONSTITUTIONAL: awake, alert, cooperative, no apparent distress, and appears stated age  EYES: Lids and lashes normal, pupils equal, round and reactive to light, extra ocular muscles intact, sclera clear, conjunctiva normal  ENT: Normocephalic, without obvious abnormality, atraumatic, external ears without lesions, oral pharynx with moist mucus membranes  NECK: Trachea midline, skin normal  LUNGS: No increased work of breathing, good air exchange  CARDIOVASCULAR: 2+ pulses throughout and capillary Refill less than 2 seconds  ABDOMEN: soft, non-distended, non-tender and no rebound tenderness or guarding  NEUROLOGIC: Awake, alert, oriented to name, place and time. Cranial nerves II-XII are grossly intact. Motor is 5 out of 5 bilaterally. Sensory is intact.    Left Upper Extremity Exam:  Sugar tong splint is intact  Demonstrates good active ROM of digits, mild swelling to her fingers  No pain with passive ROM of fingers  Sensation intact to touch distally to all digits, states sensation intact to radial, ulnar, median nerve distributions to the hand  Fingers warm and perfused, brisk cap refill  Demonstrates AIN, PIN, ulnar motor to the hand  No other tenderness about the arm to the shoulder elbow, pain focal to the wrist  Compartments supple throughout arm and forearm.     Xrays Reviewed  Left distal radius fracture metadiaphyseal junction, initial dorsal angulation with comminuted, intra-articular extension, interval closed reduction     ASSESSMENT:      ICD-10-CM     1. Other closed intra-articular fracture of distal end of left radius, initial encounter  S52.572A           PLAN:   Had lengthy discussion with patient regarding their diagnosis, typical prognosis, and expected outcomes. We reviewed the possible complications from the injury itself despite treatment chosen. We also discussed treatment options including nonoperative managements versus surgical management, along with risks and benefits of each. Patient has elected for surgical management despite associated risks. OR Wednesday, 3/3/2021 for left distal radius intra-articular fracture open reduction internal fixation  Patient to maintain sugar tong splint, elevation, nonweightbearing affected extremity  PAT and Covid testing per protocol. I have explained the risks and complications of the recommended surgery with the patient at length, as well as discussed potential treatment alternatives including nonoperative management.  These risks include but are not limited to death or complication from anesthesia, continued pain, stiffness, nerve tendon or vascular injury, infection, nonunion or malunion, symptomatic hardware or hardware failure, deep vein thrombosis or pulmonary embolism, unforeseen complications, and need for further surgery, etc.  Patient understood this, asked appropriate questions, which were all answered, and she has elected to proceed with the procedure.

## 2021-02-26 NOTE — H&P (VIEW-ONLY)
Orthopaedic New Patient Note         Yovany Muniz is a 30 y.o. female, her YOB: 1985 with the following history as recorded in [facility]:          Patient Active Problem List     Diagnosis Date Noted    Closed Colles' fracture of left radius 02/25/2021      Current Facility-Administered Medications          Current Outpatient Medications   Medication Sig Dispense Refill    olopatadine (PATANOL) 0.1 % ophthalmic solution Place 1 drop into both eyes daily        ibuprofen (ADVIL;MOTRIN) 200 MG tablet Take 200 mg by mouth every 6 hours as needed for Pain (2 to 4 tabs Q6 PRN)        oxyCODONE-acetaminophen (PERCOCET) 5-325 MG per tablet Take 1 tablet by mouth every 6 hours as needed for Pain (PRN once daily due to work).          No current facility-administered medications for this visit.          Allergies: Patient has no known allergies. Past Medical History        Past Medical History:   Diagnosis Date    Anxiety      Depression      Pulmonary embolism ([facility] [address]       again 25         Past Surgical History   History reviewed. No pertinent surgical history. Family History   History reviewed. No pertinent family history. Social History            Tobacco Use    Smoking status: Never Smoker    Smokeless tobacco: Never Used   Substance Use Topics    Alcohol use: No       Comment: social                            Review of Systems   Constitutional: Negative for fever and chills. HENT: Negative for ear pain, sore throat and sinus pressure. Eyes: Negative for pain, discharge and redness. Respiratory: Negative for cough, shortness of breath and wheezing. Cardiovascular: Negative for chest pain. Gastrointestinal: Negative for nausea, vomiting, diarrhea and abdominal distention. Genitourinary: Negative for dysuria and frequency. Musculoskeletal: Negative for back pain and arthralgias.    All other systems reviewed and negative.     CC: Left wrist injury    S: Yovany Muniz is a 30 y. o. who is here today for initial evaluation for her left wrist. DOI: 2/20/21, BEREKET: Mechanical fall. Patient is right-hand dominant. Was seen in ER on date of injury diagnosed with intra-articular left distal radius fracture, underwent closed reduction and sugar tong splint application. She presents today for initial follow-up. Did have some initial paresthesias to the hand. States these continue to be intermittent and denies numbness at this time. States she still has pain to the wrist, denies pain elsewhere. Denies any other injuries or regions of pain. She works at a  and continues to work. States she is not lifting anything at this time.     Physical Exam  Temp 98.2 °F (36.8 °C) (Oral)   LMP 12/07/2020 Comment: very, very irregular  O:   CONSTITUTIONAL: awake, alert, cooperative, no apparent distress, and appears stated age  EYES: Lids and lashes normal, pupils equal, round and reactive to light, extra ocular muscles intact, sclera clear, conjunctiva normal  ENT: Normocephalic, without obvious abnormality, atraumatic, external ears without lesions, oral pharynx with moist mucus membranes  NECK: Trachea midline, skin normal  LUNGS: No increased work of breathing, good air exchange  CARDIOVASCULAR: 2+ pulses throughout and capillary Refill less than 2 seconds  ABDOMEN: soft, non-distended, non-tender and no rebound tenderness or guarding  NEUROLOGIC: Awake, alert, oriented to name, place and time. Cranial nerves II-XII are grossly intact. Motor is 5 out of 5 bilaterally. Sensory is intact.    Left Upper Extremity Exam:  Sugar tong splint is intact  Demonstrates good active ROM of digits, mild swelling to her fingers  No pain with passive ROM of fingers  Sensation intact to touch distally to all digits, states sensation intact to radial, ulnar, median nerve distributions to the hand  Fingers warm and perfused, brisk cap refill Demonstrates AIN, PIN, ulnar motor to the hand  No other tenderness about the arm to the shoulder elbow, pain focal to the wrist  Compartments supple throughout arm and forearm.     Xrays Reviewed  Left distal radius fracture metadiaphyseal junction, initial dorsal angulation with comminuted, intra-articular extension, interval closed reduction     ASSESSMENT:      ICD-10-CM     1. Other closed intra-articular fracture of distal end of left radius, initial encounter  S52.572A           PLAN:   Had lengthy discussion with patient regarding their diagnosis, typical prognosis, and expected outcomes. We reviewed the possible complications from the injury itself despite treatment chosen. We also discussed treatment options including nonoperative managements versus surgical management, along with risks and benefits of each. Patient has elected for surgical management despite associated risks. OR Wednesday, 3/3/2021 for left distal radius intra-articular fracture open reduction internal fixation  Patient to maintain sugar tong splint, elevation, nonweightbearing affected extremity  PAT and Covid testing per protocol. I have explained the risks and complications of the recommended surgery with the patient at length, as well as discussed potential treatment alternatives including nonoperative management.  These risks include but are not limited to death or complication from anesthesia, continued pain, stiffness, nerve tendon or vascular injury, infection, nonunion or malunion, symptomatic hardware or hardware failure, deep vein thrombosis or pulmonary embolism, unforeseen complications, and need for further surgery, etc.  Patient understood this, asked appropriate questions, which were all answered, and she has elected to proceed with the procedure.

## 2021-02-27 LAB
SARS-COV-2: NOT DETECTED
SOURCE: NORMAL

## 2021-03-01 NOTE — PROGRESS NOTES
[address] PRE-ADMISSION TESTING GENERAL INSTRUCTIONS- [facility]-phone number:[phone]    GENERAL INSTRUCTIONS  [x] Antibacterial Soap shower Night before and/or AM of Surgery  [] Venu wipe instruction sheet and wipes given. [x] Nothing by mouth after midnight, including gum, candy, mints, or water. [x] You may brush your teeth, gargle, but do NOT swallow water. []Hibiclens shower  the night before and the morning of surgery. Do not use             Hibiclens on your face or head. [x]No smoking, chewing tobacco, illegal drugs, or alcohol within 24 hours of your surgery. [x] Jewelry, valuables or body piercing's should not be brought to the hospital. All body and/or tongue piercing's must be removed prior to arriving to hospital.  ALL hair pins must be removed. [x] Do not wear makeup, lotions, powders, deodorant. Nail polish as directed by the nurse. [x] Arrange transportation with a responsible adult  to and from the hospital. If you do not have a responsible adult  to transport you, you will need to make arrangements with a medical transportation company (i.e. [employer]. A Uber/taxi/bus is not appropriate unless you are accompanied by a responsible adult ). Arrange for someone to be with you for the remainder of the day and for 24 hours after your procedure due to having had anesthesia. Who will be your  for transportation?____WILL ARRANGE______________   Who will be staying with you for 24 hrs after your procedure?______WILL ARRANGE____________  [x] Bring insurance card and photo ID.  [] Transfusion Bracelet: Please bring with you to hospital, day of surgery  [x] Bring urine specimen day of surgery. Any small container is acceptable. [] Use inhalers the morning of surgery and bring with you to hospital.  [] Bring copy of living will or healthcare power of  papers to be placed in your electronic record.   [] CPAP/BI-PAP: Please bring your machine if you are to spend the night in the hospital.     PARKING INSTRUCTIONS:   [x] Arrival Time:__0700 VIA Lelan Moras DOOR___________  · [] Parking lot '\"I\"  is located on [facility] (the corner of [facility] and [facility]). To enter, press the button and the gate will lift. A free token will be provided to exit the lot. One car per patient is allowed to park in this lot. All other cars are to park on [address] [place] [address] Street either in the parking garage or the handicap lot. [] To reach the [facility] lobby from [address] [place] [address], upon entering the hospital, take elevator B to the 3rd floor. EDUCATION INSTRUCTIONS:      [] Knee or hip replacement booklet & exercise pamphlets given. [] [address] placed in chart. [] Pre-admission Testing educational folder given  [] Incentive Spirometry,coughing & deep breathing exercises reviewed. []Medication information sheet(s)   []Fluoroscopy-Xray used in surgery reviewed with patient. Educational pamphlet placed in chart. []Pain: Post-op pain is normal and to be expected. You will be asked to rate your pain from 0-10(a zero is not acceptable-education is needed). Your post-op pain goal is:  [] Ask your nurse for your pain medication. [] Joint camp offered. [] Joint replacement booklets given. [] Other:___________________________    MEDICATION INSTRUCTIONS:   [x]Bring a complete list of your medications, please write the last time you took the medicine, give this list to the nurse. [x] Take the following medications the morning of surgery with 1-2 ounces of water: PERCOSET  [] Stop herbal supplements and vitamins 5 days before your surgery. [] DO NOT take any diabetic medicine the morning of surgery. Follow instructions for insulin the day before surgery. [] If you are diabetic and your blood sugar is low or you feel symptomatic, you may drink 1-2 ounces of apple juice or take a glucose tablet.   The morning of your procedure, you may call the pre-op area if you have concerns about your blood sugar [phone]. [] Use your inhalers the morning of surgery. Bring your emergency inhaler with you day of surgery. [x] Follow physician instructions regarding any blood thinners you may be taking. STOP MOTRIN 3-1  WHAT TO EXPECT:  [x] The day of surgery you will be greeted and checked in by the [facility].  In addition, you will be registered in the [facility] by a Patient Access Representative. Please bring your photo ID and insurance card. A nurse will greet you in accordance to the time you are needed in the pre-op area to prepare you for surgery. Please do not be discouraged if you are not greeted in the order you arrive as there are many variables that are involved in patient preparation. Your patience is greatly appreciated as you wait for your nurse. Please bring in items such as: books, magazines, newspapers, electronics, or any other items  to occupy your time in the waiting area. []  Delays may occur with surgery and staff will make a sincere effort to keep you informed of delays. If any delays occur with your procedure, we apologize ahead of time for your inconvenience as we recognize the value of your time.

## 2021-03-03 ENCOUNTER — ANESTHESIA EVENT (OUTPATIENT)
Dept: OPERATING ROOM | Age: 36
End: 2021-03-03
Payer: MEDICAID

## 2021-03-03 ENCOUNTER — HOSPITAL ENCOUNTER (OUTPATIENT)
Dept: GENERAL RADIOLOGY | Age: 36
Discharge: HOME | End: 2021-03-05
Payer: MEDICAID

## 2021-03-03 ENCOUNTER — ANESTHESIA (OUTPATIENT)
Dept: OPERATING ROOM | Age: 36
End: 2021-03-03
Payer: MEDICAID

## 2021-03-03 ENCOUNTER — HOSPITAL ENCOUNTER (OUTPATIENT)
Age: 36
Setting detail: OUTPATIENT SURGERY
Discharge: HOME | End: 2021-03-03
Attending: ORTHOPAEDIC SURGERY | Admitting: ORTHOPAEDIC SURGERY
Payer: MEDICAID

## 2021-03-03 VITALS
SYSTOLIC BLOOD PRESSURE: 118 MMHG | BODY MASS INDEX: 43.05 KG/M2 | RESPIRATION RATE: 16 BRPM | HEART RATE: 88 BPM | OXYGEN SATURATION: 98 % | HEIGHT: 63 IN | DIASTOLIC BLOOD PRESSURE: 57 MMHG | WEIGHT: 243 LBS | TEMPERATURE: 97.9 F

## 2021-03-03 VITALS
DIASTOLIC BLOOD PRESSURE: 79 MMHG | RESPIRATION RATE: 14 BRPM | OXYGEN SATURATION: 100 % | SYSTOLIC BLOOD PRESSURE: 123 MMHG | TEMPERATURE: 57.2 F

## 2021-03-03 DIAGNOSIS — S52.532A CLOSED COLLES' FRACTURE OF LEFT RADIUS, INITIAL ENCOUNTER: Primary | ICD-10-CM

## 2021-03-03 DIAGNOSIS — T14.8XXA FRACTURE: ICD-10-CM

## 2021-03-03 LAB
HCG, URINE, POC: NEGATIVE
Lab: NORMAL
NEGATIVE QC PASS/FAIL: NORMAL
POSITIVE QC PASS/FAIL: NORMAL

## 2021-03-03 PROCEDURE — 2720000010 HC SURG SUPPLY STERILE: Performed by: ORTHOPAEDIC SURGERY

## 2021-03-03 PROCEDURE — 7100000001 HC PACU RECOVERY - ADDTL 15 MIN: Performed by: ORTHOPAEDIC SURGERY

## 2021-03-03 PROCEDURE — 6360000002 HC RX W HCPCS: Performed by: ANESTHESIOLOGY

## 2021-03-03 PROCEDURE — 2500000003 HC RX 250 WO HCPCS

## 2021-03-03 PROCEDURE — 7100000011 HC PHASE II RECOVERY - ADDTL 15 MIN: Performed by: ORTHOPAEDIC SURGERY

## 2021-03-03 PROCEDURE — 7100000010 HC PHASE II RECOVERY - FIRST 15 MIN: Performed by: ORTHOPAEDIC SURGERY

## 2021-03-03 PROCEDURE — 3700000000 HC ANESTHESIA ATTENDED CARE: Performed by: ORTHOPAEDIC SURGERY

## 2021-03-03 PROCEDURE — 2580000003 HC RX 258: Performed by: PHYSICIAN ASSISTANT

## 2021-03-03 PROCEDURE — 3600000004 HC SURGERY LEVEL 4 BASE: Performed by: ORTHOPAEDIC SURGERY

## 2021-03-03 PROCEDURE — 6370000000 HC RX 637 (ALT 250 FOR IP): Performed by: ANESTHESIOLOGY

## 2021-03-03 PROCEDURE — 2500000003 HC RX 250 WO HCPCS: Performed by: ANESTHESIOLOGY

## 2021-03-03 PROCEDURE — 2709999900 HC NON-CHARGEABLE SUPPLY: Performed by: ORTHOPAEDIC SURGERY

## 2021-03-03 PROCEDURE — 7100000000 HC PACU RECOVERY - FIRST 15 MIN: Performed by: ORTHOPAEDIC SURGERY

## 2021-03-03 PROCEDURE — 3600000014 HC SURGERY LEVEL 4 ADDTL 15MIN: Performed by: ORTHOPAEDIC SURGERY

## 2021-03-03 PROCEDURE — 6360000002 HC RX W HCPCS: Performed by: PHYSICIAN ASSISTANT

## 2021-03-03 PROCEDURE — 3209999900 FLUORO FOR SURGICAL PROCEDURES

## 2021-03-03 PROCEDURE — 6360000002 HC RX W HCPCS

## 2021-03-03 PROCEDURE — C1713 ANCHOR/SCREW BN/BN,TIS/BN: HCPCS | Performed by: ORTHOPAEDIC SURGERY

## 2021-03-03 PROCEDURE — 3700000001 HC ADD 15 MINUTES (ANESTHESIA): Performed by: ORTHOPAEDIC SURGERY

## 2021-03-03 PROCEDURE — 25609 OPTX DST RD XART FX/EP SEP3+: CPT | Performed by: ORTHOPAEDIC SURGERY

## 2021-03-03 PROCEDURE — 64415 NJX AA&/STRD BRCH PLXS IMG: CPT | Performed by: ANESTHESIOLOGY

## 2021-03-03 DEVICE — SCREW BNE L14MM DIA2.7MM CORT S STL ST T8 STARDRV RECESS: Type: IMPLANTABLE DEVICE | Site: WRIST | Status: FUNCTIONAL

## 2021-03-03 DEVICE — SCREW BNE L20MM DIA2.4MM DST RAD VOLAR S STL ST VAR ANG LOK: Type: IMPLANTABLE DEVICE | Site: WRIST | Status: FUNCTIONAL

## 2021-03-03 DEVICE — IMPLANTABLE DEVICE: Type: IMPLANTABLE DEVICE | Site: WRIST | Status: FUNCTIONAL

## 2021-03-03 DEVICE — SCREW BNE L18MM DIA2.4MM DST RAD VOLAR S STL ST VAR ANG LOK: Type: IMPLANTABLE DEVICE | Site: WRIST | Status: FUNCTIONAL

## 2021-03-03 DEVICE — SCREW BNE L16MM DIA2.7MM CORT S STL ST T8 STARDRV RECESS: Type: IMPLANTABLE DEVICE | Site: WRIST | Status: FUNCTIONAL

## 2021-03-03 DEVICE — SCREW BNE L14MM DIA2.4MM DST RAD VOLAR S STL ST VAR ANG LOK: Type: IMPLANTABLE DEVICE | Site: WRIST | Status: FUNCTIONAL

## 2021-03-03 RX ORDER — BUPIVACAINE HYDROCHLORIDE 5 MG/ML
INJECTION, SOLUTION EPIDURAL; INTRACAUDAL
Status: COMPLETED | OUTPATIENT
Start: 2021-03-03 | End: 2021-03-03

## 2021-03-03 RX ORDER — NEOSTIGMINE METHYLSULFATE 1 MG/ML
INJECTION, SOLUTION INTRAVENOUS PRN
Status: DISCONTINUED | OUTPATIENT
Start: 2021-03-03 | End: 2021-03-03 | Stop reason: SDUPTHER

## 2021-03-03 RX ORDER — HYDROCODONE BITARTRATE AND ACETAMINOPHEN 5; 325 MG/1; MG/1
1 TABLET ORAL PRN
Status: COMPLETED | OUTPATIENT
Start: 2021-03-03 | End: 2021-03-03

## 2021-03-03 RX ORDER — ROPIVACAINE HYDROCHLORIDE 5 MG/ML
30 INJECTION, SOLUTION EPIDURAL; INFILTRATION; PERINEURAL ONCE
Status: COMPLETED | OUTPATIENT
Start: 2021-03-03 | End: 2021-03-03

## 2021-03-03 RX ORDER — PROPOFOL 10 MG/ML
INJECTION, EMULSION INTRAVENOUS PRN
Status: DISCONTINUED | OUTPATIENT
Start: 2021-03-03 | End: 2021-03-03 | Stop reason: SDUPTHER

## 2021-03-03 RX ORDER — HYDROCODONE BITARTRATE AND ACETAMINOPHEN 5; 325 MG/1; MG/1
2 TABLET ORAL PRN
Status: COMPLETED | OUTPATIENT
Start: 2021-03-03 | End: 2021-03-03

## 2021-03-03 RX ORDER — ROPIVACAINE HYDROCHLORIDE 5 MG/ML
INJECTION, SOLUTION EPIDURAL; INFILTRATION; PERINEURAL
Status: COMPLETED | OUTPATIENT
Start: 2021-03-03 | End: 2021-03-03

## 2021-03-03 RX ORDER — PROMETHAZINE HYDROCHLORIDE 25 MG/ML
6.25 INJECTION, SOLUTION INTRAMUSCULAR; INTRAVENOUS EVERY 10 MIN PRN
Status: DISCONTINUED | OUTPATIENT
Start: 2021-03-03 | End: 2021-03-03 | Stop reason: HOSPADM

## 2021-03-03 RX ORDER — SODIUM CHLORIDE 0.9 % (FLUSH) 0.9 %
10 SYRINGE (ML) INJECTION EVERY 12 HOURS SCHEDULED
Status: DISCONTINUED | OUTPATIENT
Start: 2021-03-03 | End: 2021-03-03 | Stop reason: HOSPADM

## 2021-03-03 RX ORDER — MEPERIDINE HYDROCHLORIDE 25 MG/ML
12.5 INJECTION INTRAMUSCULAR; INTRAVENOUS; SUBCUTANEOUS EVERY 5 MIN PRN
Status: DISCONTINUED | OUTPATIENT
Start: 2021-03-03 | End: 2021-03-03 | Stop reason: HOSPADM

## 2021-03-03 RX ORDER — LIDOCAINE HYDROCHLORIDE 20 MG/ML
INJECTION, SOLUTION INTRAVENOUS PRN
Status: DISCONTINUED | OUTPATIENT
Start: 2021-03-03 | End: 2021-03-03 | Stop reason: SDUPTHER

## 2021-03-03 RX ORDER — OXYCODONE HYDROCHLORIDE AND ACETAMINOPHEN 5; 325 MG/1; MG/1
1 TABLET ORAL EVERY 6 HOURS PRN
Qty: 12 TABLET | Refills: 0 | Status: SHIPPED | OUTPATIENT
Start: 2021-03-03 | End: 2021-03-06

## 2021-03-03 RX ORDER — MORPHINE SULFATE 2 MG/ML
1 INJECTION, SOLUTION INTRAMUSCULAR; INTRAVENOUS EVERY 5 MIN PRN
Status: DISCONTINUED | OUTPATIENT
Start: 2021-03-03 | End: 2021-03-03 | Stop reason: HOSPADM

## 2021-03-03 RX ORDER — GLYCOPYRROLATE 1 MG/5 ML
SYRINGE (ML) INTRAVENOUS PRN
Status: DISCONTINUED | OUTPATIENT
Start: 2021-03-03 | End: 2021-03-03 | Stop reason: SDUPTHER

## 2021-03-03 RX ORDER — ONDANSETRON 2 MG/ML
INJECTION INTRAMUSCULAR; INTRAVENOUS PRN
Status: DISCONTINUED | OUTPATIENT
Start: 2021-03-03 | End: 2021-03-03 | Stop reason: SDUPTHER

## 2021-03-03 RX ORDER — MIDAZOLAM HYDROCHLORIDE 2 MG/2ML
2 INJECTION, SOLUTION INTRAMUSCULAR; INTRAVENOUS ONCE
Status: COMPLETED | OUTPATIENT
Start: 2021-03-03 | End: 2021-03-03

## 2021-03-03 RX ORDER — MORPHINE SULFATE 2 MG/ML
2 INJECTION, SOLUTION INTRAMUSCULAR; INTRAVENOUS EVERY 5 MIN PRN
Status: DISCONTINUED | OUTPATIENT
Start: 2021-03-03 | End: 2021-03-03 | Stop reason: HOSPADM

## 2021-03-03 RX ORDER — SODIUM CHLORIDE 9 MG/ML
INJECTION, SOLUTION INTRAVENOUS CONTINUOUS PRN
Status: DISCONTINUED | OUTPATIENT
Start: 2021-03-03 | End: 2021-03-03

## 2021-03-03 RX ORDER — ROCURONIUM BROMIDE 10 MG/ML
INJECTION, SOLUTION INTRAVENOUS PRN
Status: DISCONTINUED | OUTPATIENT
Start: 2021-03-03 | End: 2021-03-03 | Stop reason: SDUPTHER

## 2021-03-03 RX ORDER — FENTANYL CITRATE 50 UG/ML
100 INJECTION, SOLUTION INTRAMUSCULAR; INTRAVENOUS ONCE
Status: COMPLETED | OUTPATIENT
Start: 2021-03-03 | End: 2021-03-03

## 2021-03-03 RX ORDER — MIDAZOLAM HYDROCHLORIDE 1 MG/ML
INJECTION INTRAMUSCULAR; INTRAVENOUS PRN
Status: DISCONTINUED | OUTPATIENT
Start: 2021-03-03 | End: 2021-03-03 | Stop reason: SDUPTHER

## 2021-03-03 RX ORDER — SODIUM CHLORIDE 0.9 % (FLUSH) 0.9 %
10 SYRINGE (ML) INJECTION PRN
Status: DISCONTINUED | OUTPATIENT
Start: 2021-03-03 | End: 2021-03-03 | Stop reason: HOSPADM

## 2021-03-03 RX ORDER — FENTANYL CITRATE 50 UG/ML
INJECTION, SOLUTION INTRAMUSCULAR; INTRAVENOUS PRN
Status: DISCONTINUED | OUTPATIENT
Start: 2021-03-03 | End: 2021-03-03 | Stop reason: SDUPTHER

## 2021-03-03 RX ORDER — SODIUM CHLORIDE, SODIUM LACTATE, POTASSIUM CHLORIDE, CALCIUM CHLORIDE 600; 310; 30; 20 MG/100ML; MG/100ML; MG/100ML; MG/100ML
INJECTION, SOLUTION INTRAVENOUS CONTINUOUS
Status: DISCONTINUED | OUTPATIENT
Start: 2021-03-03 | End: 2021-03-03 | Stop reason: HOSPADM

## 2021-03-03 RX ADMIN — HYDROCODONE BITARTRATE AND ACETAMINOPHEN 2 TABLET: 5; 325 TABLET ORAL at 12:45

## 2021-03-03 RX ADMIN — MIDAZOLAM 1 MG: 1 INJECTION INTRAMUSCULAR; INTRAVENOUS at 09:13

## 2021-03-03 RX ADMIN — SODIUM CHLORIDE, POTASSIUM CHLORIDE, SODIUM LACTATE AND CALCIUM CHLORIDE: 600; 310; 30; 20 INJECTION, SOLUTION INTRAVENOUS at 07:35

## 2021-03-03 RX ADMIN — ROPIVACAINE HYDROCHLORIDE 30 ML: 5 INJECTION, SOLUTION EPIDURAL; INFILTRATION; PERINEURAL at 08:56

## 2021-03-03 RX ADMIN — MEPERIDINE HYDROCHLORIDE 12.5 MG: 25 INJECTION, SOLUTION INTRAMUSCULAR; INTRAVENOUS; SUBCUTANEOUS at 10:52

## 2021-03-03 RX ADMIN — PROPOFOL 200 MG: 10 INJECTION, EMULSION INTRAVENOUS at 09:23

## 2021-03-03 RX ADMIN — FENTANYL CITRATE 50 MCG: 50 INJECTION, SOLUTION INTRAMUSCULAR; INTRAVENOUS at 09:23

## 2021-03-03 RX ADMIN — MIDAZOLAM 2 MG: 1 INJECTION INTRAMUSCULAR; INTRAVENOUS at 08:50

## 2021-03-03 RX ADMIN — MEPERIDINE HYDROCHLORIDE 12.5 MG: 25 INJECTION, SOLUTION INTRAMUSCULAR; INTRAVENOUS; SUBCUTANEOUS at 10:45

## 2021-03-03 RX ADMIN — Medication 0.6 MG: at 10:22

## 2021-03-03 RX ADMIN — ROPIVACAINE HYDROCHLORIDE 30 ML: 5 INJECTION EPIDURAL; INFILTRATION; PERINEURAL at 08:55

## 2021-03-03 RX ADMIN — FENTANYL CITRATE 100 MCG: 50 INJECTION, SOLUTION INTRAMUSCULAR; INTRAVENOUS at 08:50

## 2021-03-03 RX ADMIN — Medication 2 G: at 09:32

## 2021-03-03 RX ADMIN — MORPHINE SULFATE 2 MG: 2 INJECTION, SOLUTION INTRAMUSCULAR; INTRAVENOUS at 11:25

## 2021-03-03 RX ADMIN — LIDOCAINE HYDROCHLORIDE 60 MG: 20 INJECTION, SOLUTION INTRAVENOUS at 09:23

## 2021-03-03 RX ADMIN — BUPIVACAINE HYDROCHLORIDE 30 ML: 5 INJECTION, SOLUTION EPIDURAL; INTRACAUDAL at 09:48

## 2021-03-03 RX ADMIN — ONDANSETRON HYDROCHLORIDE 4 MG: 2 INJECTION, SOLUTION INTRAMUSCULAR; INTRAVENOUS at 10:08

## 2021-03-03 RX ADMIN — ROPIVACAINE HYDROCHLORIDE 20 ML: 5 INJECTION EPIDURAL; INFILTRATION; PERINEURAL at 09:48

## 2021-03-03 RX ADMIN — ROCURONIUM BROMIDE 40 MG: 10 INJECTION, SOLUTION INTRAVENOUS at 09:23

## 2021-03-03 RX ADMIN — Medication 3 MG: at 10:22

## 2021-03-03 RX ADMIN — SODIUM CHLORIDE, POTASSIUM CHLORIDE, SODIUM LACTATE AND CALCIUM CHLORIDE: 600; 310; 30; 20 INJECTION, SOLUTION INTRAVENOUS at 10:31

## 2021-03-03 ASSESSMENT — PULMONARY FUNCTION TESTS
PIF_VALUE: 0
PIF_VALUE: 18
PIF_VALUE: 27
PIF_VALUE: 22
PIF_VALUE: 4
PIF_VALUE: 28
PIF_VALUE: 27
PIF_VALUE: 3
PIF_VALUE: 27
PIF_VALUE: 27
PIF_VALUE: 28
PIF_VALUE: 34
PIF_VALUE: 17
PIF_VALUE: 28
PIF_VALUE: 27
PIF_VALUE: 27
PIF_VALUE: 30
PIF_VALUE: 43
PIF_VALUE: 28
PIF_VALUE: 0
PIF_VALUE: 26
PIF_VALUE: 28
PIF_VALUE: 25
PIF_VALUE: 31
PIF_VALUE: 0
PIF_VALUE: 28
PIF_VALUE: 29
PIF_VALUE: 28
PIF_VALUE: 28
PIF_VALUE: 29
PIF_VALUE: 19
PIF_VALUE: 29
PIF_VALUE: 18
PIF_VALUE: 31
PIF_VALUE: 40
PIF_VALUE: 0

## 2021-03-03 ASSESSMENT — PAIN SCALES - GENERAL
PAINLEVEL_OUTOF10: 0
PAINLEVEL_OUTOF10: 7
PAINLEVEL_OUTOF10: 4

## 2021-03-03 ASSESSMENT — PAIN DESCRIPTION - LOCATION
LOCATION: ARM

## 2021-03-03 ASSESSMENT — PAIN DESCRIPTION - DESCRIPTORS
DESCRIPTORS: ACHING;BURNING;DISCOMFORT
DESCRIPTORS: ACHING;BURNING;DISCOMFORT

## 2021-03-03 ASSESSMENT — PAIN DESCRIPTION - PAIN TYPE
TYPE: SURGICAL PAIN;REFERRED PAIN
TYPE: SURGICAL PAIN

## 2021-03-03 ASSESSMENT — PAIN DESCRIPTION - ORIENTATION
ORIENTATION: LEFT
ORIENTATION: LEFT

## 2021-03-03 ASSESSMENT — PAIN - FUNCTIONAL ASSESSMENT: PAIN_FUNCTIONAL_ASSESSMENT: 0-10

## 2021-03-03 ASSESSMENT — PAIN DESCRIPTION - ONSET: ONSET: AWAKENED FROM SLEEP

## 2021-03-03 NOTE — ANESTHESIA PROCEDURE NOTES
Peripheral Block    Patient location during procedure: pre-op  Start time: 3/3/2021 9:13 AM  End time: 3/3/2021 9:21 AM  Staffing  Performed: anesthesiologist and other anesthesia staff   Anesthesiologist: Jozef Torres MD  Other anesthesia staff: Ariella Lucero RN  Preanesthetic Checklist  Completed: patient identified, IV checked, site marked, risks and benefits discussed, surgical consent, monitors and equipment checked, pre-op evaluation, timeout performed, anesthesia consent given, oxygen available and patient being monitored  Peripheral Block  Patient position: supine  Prep: ChloraPrep  Patient monitoring: cardiac monitor, continuous pulse ox, frequent blood pressure checks and IV access  Block type: Brachial plexus  Laterality: left  Injection technique: single-shot  Guidance: ultrasound guided  Local infiltration: lidocaine  Supraclavicular  Provider prep: mask and sterile gloves  Local infiltration: lidocaine  Needle  Needle gauge: 21 G  Needle length: 10 cm  Needle localization: ultrasound guidance  Assessment  Injection assessment: negative aspiration for heme, no paresthesia on injection and local visualized surrounding nerve on ultrasound  Paresthesia pain: none  Slow fractionated injection: yes  Hemodynamics: stable  Additional Notes  U/S 16344.  (1) Under ultrasound guidance, a  gauge needle was inserted and placed in close proximity to the  nerve.  (2) Ultrasound was also used to visualize the spread of the anesthetic in close proximity to the nerve being blocked. (3) The nerve appeared anatomically normal, and (4 there were no apparent abnormal pathological findings on the image that were readily visible and related to the nerve being blocked. (5) A permanent ultrasound image was saved in the patient's record.             Medications Administered  Ropivacaine (NAROPIN) injection 0.5%, 20 mL  bupivacaine (MARCAINE) PF injection 0.5%, 30 mL  Reason for block: post-op pain management and at surgeon's request

## 2021-03-03 NOTE — ANESTHESIA PROCEDURE NOTES
Peripheral Block    Patient location during procedure: holding area  Start time: 3/3/2021 8:50 AM  End time: 3/3/2021 9:00 AM  Staffing  Performed: other anesthesia staff   Anesthesiologist: Darcie Morgan MD  Other anesthesia staff:  Wes Patterson RN  Preanesthetic Checklist  Completed: patient identified, IV checked, site marked, risks and benefits discussed, surgical consent, monitors and equipment checked, pre-op evaluation, timeout performed, anesthesia consent given, oxygen available and patient being monitored  Peripheral Block  Patient position: supine  Prep: ChloraPrep and site prepped and draped  Patient monitoring: cardiac monitor, continuous pulse ox, frequent blood pressure checks and IV access  Block type: Brachial plexus  Laterality: left  Injection technique: single-shot  Guidance: ultrasound guided  Supraclavicular  Provider prep: mask and sterile gloves  Needle  Needle type: combined needle/nerve stimulator   Needle gauge: 22 G  Needle length: 5 cm  Needle localization: anatomical landmarks and ultrasound guidance  Assessment  Injection assessment: negative aspiration for heme, no paresthesia on injection and local visualized surrounding nerve on ultrasound  Paresthesia pain: none  Slow fractionated injection: yes  Hemodynamics: stable  Medications Administered  Ropivacaine (NAROPIN) injection 0.5%, 30 mL  Reason for block: post-op pain management and at surgeon's request

## 2021-03-03 NOTE — OP NOTE
Operative Note      Patient: Edouard Mccabe  YOB: 1985  MRN: [id number]    Date of Procedure: 3/3/2021    Pre-Op Diagnosis: LEFT DISTAL RADIUS INTRAARTICULAR FX    Post-Op Diagnosis: Same       Procedure(s):  LEFT DISTAL RADIUS INTRAARTICULAR FRACTURE OPEN REDUCTION INTERNAL FIXATION    Surgeon(s):  Kaur Workman DO    Assistant:   Resident: Glen Sims DO; Izabela Jang DO    Anesthesia: General    Estimated Blood Loss (mL): less than 50     Complications: None    Specimens:   * No specimens in log *    Implants:  Implant Name Type Inv. Item Serial No.  Lot No. LRB No. Used Action   PLATE BNE [id number] LC 6X4 H ST L DST RAD VOLAR S STL CELIA  PLATE BNE [id number] LC 6X4 H ST L DST RAD VOLAR S STL CELIA  DEPU[employer]  Left 1 Implanted   SCREW BNE L18MM DIA2.4MM DST RAD VOLAR S STL ST CELIA ANG BARON  SCREW BNE L18MM DIA2.4MM DST RAD VOLAR S STL ST CELIA ANG BARON  DEPUY SYNTHES [employer]-WD  Left 2 Implanted   SCREW BNE L14MM DIA2.4MM DST RAD VOLAR S STL ST CELIA ANG BARON  SCREW BNE L14MM DIA2.4MM DST RAD VOLAR S STL ST CELIA ANG BARON  DEPUY SYN[employer]-WD  Left 1 Implanted   SCREW BNE L20MM DIA2.4MM DST RAD VOLAR S STL ST CELIA ANG BARON  SCREW BNE L20MM DIA2.4MM DST RAD VOLAR S STL ST CELIA ANG BARON  DEPUY SYN[employer]-WD  Left 1 Implanted   SCREW BNE L14MM DIA2.7MM SAVANNA S STL ST T8 STARDRV RECESS  SCREW BNE L14MM DIA2.7MM SAVANNA S STL ST T8 STARDRV RECESS  DEPUY SYNTHES [employer]-WD  Left 2 Implanted   SCREW BNE L16MM DIA2.7MM SAVANNA S STL ST T8 STARDRV RECESS  SCREW BNE L16MM DIA2.7MM SAVANNA S STL ST T8 STARDRV RECESS  DEPUY SYNTHES [employer]-WD  Left 1 Implanted         Drains: * No LDAs found *    Detailed Description of Procedure:   Patient brought to the operating suite where She was placed on operating table supine position. Received a general anesthetic by the department of anesthesia as well as 2 g of Ancef intravenously.   The left upper extremity was sterilely prepped and draped out in standard crepitus or catching. Tourniquet was let down this juncture, good hemostasis was obtained. Wounds were irrigated. Subcutaneous tissues were closed with Monocryl skin with nylon. Wound was dressed with antibiotic ointment Xeroform 4 x 4 fluffs web roll and a well-padded volar short arm splint. Patient was then awoken uneventfully from anesthetic transferred to [facility] to the postanesthesia care in stable condition.     Postoperative plans:  Patient will be discharged home today as this was scheduled as an outpatient procedure. She is to maintain her splint, elevation, nonweightbearing affected extremity. She will see us back in the office in approximately 2 weeks for repeat evaluation. Electronically signed by Vernell Lerma DO on 3/3/2021     NOTE: This report was transcribed using voice recognition software.  Every effort was made to ensure accuracy; however, inadvertent computerized transcription errors may be present

## 2021-03-03 NOTE — INTERVAL H&P NOTE
Update History & Physical    The patient's History and Physical of February 26, 2021 was reviewed with the patient and I examined the patient. There was no change. The surgical site was confirmed by the patient and me. Plan: Left distal radius fracture ORIF    I have explained the risks and complications of the recommended surgery with the patient at length, as well as discussed potential treatment alternatives including nonoperative management. These risks include but are not limited to death or complication from anesthesia, continued pain, nerve tendon or vascular injury, infection, nonunion or malunion, symptomatic hardware or hardware failure, stiffness, deep vein thrombosis or pulmonary embolism, and need for further surgery, etc.  Patient understood this, asked appropriate questions, which were all answered, and she has elected to proceed with the procedure.       Electronically signed by Jennifer Vasquez DO on 3/3/2021 at 8:43 AM

## 2021-03-03 NOTE — BRIEF OP NOTE
Brief Postoperative Note      Patient: Edouard Mccabe  YOB: 1985  MRN: [id number]    Date of Procedure: 3/3/2021    Pre-Op Diagnosis: LEFT DISTAL RADIUS INTRAARTICULAR FX    Post-Op Diagnosis: Same       Procedure(s):  LEFT DISTAL RADIUS OPEN REDUCTION INTERNAL FIXATION    Surgeon(s):  Kaur Workman DO    Assistant:  Resident: Glen Sims DO; Ashok Aragon DO    Anesthesia: General    Estimated Blood Loss (mL): less than 50     Complications: None    Specimens:   * No specimens in log *    Implants:  Implant Name Type Inv.  Item Serial No.  Lot No. LRB No. Used Action   PLATE BNE [id number] LC 6X4 H ST L DST RAD VOLAR S STL CELIA  PLATE BNE [id number] LC 6X4 H ST L DST RAD VOLAR S STL CELIA  DEPUY [employer]  Left 1 Implanted   SCREW BNE L18MM DIA2.4MM DST RAD VOLAR S STL ST CELIA ANG BARON  SCREW BNE L18MM DIA2.4MM DST RAD VOLAR S STL ST CELIA ANG BARON  DEPUY SYNTHES [employer]WD  Left 2 Implanted   SCREW BNE L14MM DIA2.4MM DST RAD VOLAR S STL ST CELIA ANG BARON  SCREW BNE L14MM DIA2.4MM DST RAD VOLAR S STL ST CELIA ANG BARON  DEPUY SYNTHES [employer]-WD  Left 1 Implanted   SCREW BNE L20MM DIA2.4MM DST RAD VOLAR S STL ST CELIA ANG BARON  SCREW BNE L20MM DIA2.4MM DST RAD VOLAR S STL ST CELIA ANG BARON  DEPUY [employer]WD  Left 1 Implanted   SCREW BNE L14MM DIA2.7MM SAVANNA S STL ST T8 STARDRV RECESS  SCREW BNE L14MM DIA2.7MM SAVANNA S STL ST T8 STARDRV RECESS  DEPUY SYNTHES [employer]-WD  Left 2 Implanted   SCREW BNE L16MM DIA2.7MM SAVANNA S STL ST T8 STARDRV RECESS  SCREW BNE L16MM DIA2.7MM SAVANNA S STL ST T8 STARDRV RECESS  DEPUY SYNTHES [employer]WD  Left 1 Implanted         Drains: * No LDAs found *    Findings: see op note    Electronically signed by Charmaine Curran DO on 3/3/2021 at 10:29 AM

## 2021-03-03 NOTE — PROGRESS NOTES
Admitted to [facility]. Instructions reviewed    Covid test done: 2/25    Results: neg    Self quarantine guidelines followed since tested? yes    Any unusual S/S or concerns expressed/observed?  no

## 2021-03-03 NOTE — ANESTHESIA POSTPROCEDURE EVALUATION
Department of Anesthesiology  Postprocedure Note    Patient: Beata Cote  MRN: [id number]  YOB: 1985  Date of evaluation: 3/3/2021  Time:  12:31 PM     Procedure Summary     Date: 03/03/21 Room / Location: [facility] OR 08 / [facility]    Anesthesia Start: 3850 Anesthesia Stop:     Procedure: LEFT DISTAL RADIUS OPEN REDUCTION INTERNAL FIXATION (Left Wrist) Diagnosis: (LEFT DISTAL RADIUS INTRAARTICULAR FX)    Surgeons: eTrrence Whittaker DO Responsible Provider: Ed Hamilton MD    Anesthesia Type: general, regional ASA Status: 2          Anesthesia Type: general, regional    Deanna Phase I: Deanna Score: 10    Deanna Phase II: Deanna Score: 10    Last vitals: Reviewed and per EMR flowsheets.        Anesthesia Post Evaluation    Patient location during evaluation: PACU  Patient participation: complete - patient participated  Level of consciousness: awake  Pain score: 3  Airway patency: patent  Nausea & Vomiting: no nausea and no vomiting  Complications: no  Cardiovascular status: blood pressure returned to baseline  Respiratory status: acceptable  Hydration status: euvolemic

## 2021-03-03 NOTE — PROGRESS NOTES
1310-Patient and family verbaize understanding of given discharge instructions. 1320-Patient discharged per wheelchair to car driven by  in stable condition with left arm sling intact.

## 2021-03-03 NOTE — ANESTHESIA PRE PROCEDURE
Department of Anesthesiology  Preprocedure Note       Name:  Josh Thakkar   Age:  30 y.o.  :  1985                                          MRN:  [id number]         Date:  3/3/2021      Surgeon: Shannan Muller):  Arpan Olivera DO    Procedure: Procedure(s):  LEFT DISTAL RADIUS OPEN REDUCTION INTERNAL FIXATION--SYNTHES    Medications prior to admission:   Prior to Admission medications    Medication Sig Start Date End Date Taking? Authorizing Provider   olopatadine (PATANOL) 0.1 % ophthalmic solution Place 1 drop into both eyes daily 20  Yes Historical Provider, MD   ibuprofen (ADVIL;MOTRIN) 200 MG tablet Take 200 mg by mouth every 6 hours as needed for Pain (2 to 4 tabs Q6 PRN)   Yes Historical Provider, MD   oxyCODONE-acetaminophen (PERCOCET) 5-325 MG per tablet Take 1 tablet by mouth every 6 hours as needed for Pain for up to 7 days.  3/1/21 3/8/21 Yes Liliane Swift PA-C       Current medications:    Current Facility-Administered Medications   Medication Dose Route Frequency Provider Last Rate Last Admin    ceFAZolin (ANCEF) 2000 mg in sterile water 20 mL IV syringe  2,000 mg Intravenous On Call to [address]arza PA-C        lactated ringers infusion   Intravenous Continuous Liliane Swift PA-C 125 mL/hr at 21 0735 New Bag at 21 0735    sodium chloride flush 0.9 % injection 10 mL  10 mL Intravenous 2 times per day Liliane Swift PA-C        sodium chloride flush 0.9 % injection 10 mL  10 mL Intravenous PRN Liliane Swift PA-C           Allergies:  No Known Allergies    Problem List:    Patient Active Problem List   Diagnosis Code    Closed Colles' fracture of left radius S52.532A       Past Medical History:        Diagnosis Date    Anxiety     Depression     Pulmonary embolism ([facility] [address]        Past Surgical History:        Procedure Laterality Date    TONSILLECTOMY         Social History:    Social History     Tobacco Use    Smoking status: Never Smoker    Smokeless tobacco: Never Used   Substance Use Topics    Alcohol use: No     Comment: social                                Counseling given: Not Answered      Vital Signs (Current):   Vitals:    03/03/21 0830 03/03/21 0845 03/03/21 0855 03/03/21 0900   BP: 125/80 119/79 (!) 92/58 99/70   Pulse: 63 62 68 61   Resp: 17 19 17 11   Temp:       TempSrc:       SpO2: 100% 100% 98% 100%   Weight:       Height:                                                  BP Readings from Last 3 Encounters:   03/03/21 99/70   02/20/21 132/70   04/05/19 120/82       NPO Status: Time of last liquid consumption: 2200                        Time of last solid consumption: 1800                        Date of last liquid consumption: 03/02/21                        Date of last solid food consumption: 03/02/21    BMI:   Wt Readings from Last 3 Encounters:   03/03/21 243 lb (110.2 kg)   02/20/21 237 lb (107.5 kg)   04/05/19 237 lb (107.5 kg)     Body mass index is 43.05 kg/m². CBC:   Lab Results   Component Value Date    WBC 8.8 04/05/2019    RBC 5.11 04/05/2019    HGB 14.0 04/05/2019    HCT 42.3 04/05/2019    MCV 82.8 04/05/2019    RDW 12.9 04/05/2019     04/05/2019       CMP:   Lab Results   Component Value Date     04/05/2019    K 5.0 04/05/2019     04/05/2019    CO2 25 04/05/2019    BUN 11 04/05/2019    CREATININE 0.8 04/05/2019    GFRAA >60 04/05/2019    LABGLOM >60 04/05/2019    GLUCOSE 101 04/05/2019    PROT 7.8 04/05/2019    CALCIUM 9.2 04/05/2019    BILITOT 0.8 04/05/2019    ALKPHOS 86 04/05/2019    AST 30 04/05/2019    ALT 16 04/05/2019       POC Tests: No results for input(s): POCGLU, POCNA, POCK, POCCL, POCBUN, POCHEMO, POCHCT in the last 72 hours.     Coags: No results found for: PROTIME, INR, APTT    HCG (If Applicable):   Lab Results   Component Value Date    PREGTESTUR negative 11/16/2018        ABGs: No results found for: PHART, PO2ART, NQA0EUV, QXH7BMN, BEART, J9QLGJVQ     Type & Screen (If Applicable):  No results found for: LABABO, LABRH    Drug/Infectious Status (If Applicable):  No results found for: HIV, HEPCAB    COVID-19 Screening (If Applicable):   Lab Results   Component Value Date    COVID19 Not Detected 02/25/2021         Anesthesia Evaluation  Patient summary reviewed and Nursing notes reviewed no history of anesthetic complications:   Airway: Mallampati: II  TM distance: >3 FB   Neck ROM: full  Mouth opening: > = 3 FB Dental: normal exam     Comment: Patient denies loose. Pulmonary:Negative Pulmonary ROS breath sounds clear to auscultation                             Cardiovascular:Negative CV ROS            Rhythm: regular  Rate: normal                    Neuro/Psych:   (+) psychiatric history:depression/anxiety             GI/Hepatic/Renal: Neg GI/Hepatic/Renal ROS            Endo/Other: Negative Endo/Other ROS                    Abdominal:           Vascular:   + PE (not on anticoagulant). Anesthesia Plan      general and regional     ASA 2       Induction: intravenous. MIPS: Postoperative opioids intended, Prophylactic antiemetics administered and Postoperative trial extubation. Anesthetic plan and risks discussed with patient. Plan discussed with CRNA and attending. Jayden Banerjee RN   3/3/2021      Pt seen, examined, chart reviewed, plan discussed.   Gwen Smith  3/3/2021  9:48 AM

## 2021-03-19 DIAGNOSIS — S52.572A OTHER CLOSED INTRA-ARTICULAR FRACTURE OF DISTAL END OF LEFT RADIUS, INITIAL ENCOUNTER: Primary | ICD-10-CM

## 2021-03-22 ENCOUNTER — HOSPITAL ENCOUNTER (OUTPATIENT)
Dept: GENERAL RADIOLOGY | Age: 36
Discharge: HOME | End: 2021-03-24
Payer: MEDICAID

## 2021-03-22 ENCOUNTER — OFFICE VISIT (OUTPATIENT)
Dept: ORTHOPEDIC SURGERY | Age: 36
End: 2021-03-22
Payer: MEDICAID

## 2021-03-22 VITALS — BODY MASS INDEX: 41.82 KG/M2 | WEIGHT: 236 LBS | HEIGHT: 63 IN

## 2021-03-22 DIAGNOSIS — S52.572A OTHER CLOSED INTRA-ARTICULAR FRACTURE OF DISTAL END OF LEFT RADIUS, INITIAL ENCOUNTER: Primary | ICD-10-CM

## 2021-03-22 DIAGNOSIS — S52.572A OTHER CLOSED INTRA-ARTICULAR FRACTURE OF DISTAL END OF LEFT RADIUS, INITIAL ENCOUNTER: ICD-10-CM

## 2021-03-22 PROCEDURE — 99212 OFFICE O/P EST SF 10 MIN: CPT | Performed by: PHYSICIAN ASSISTANT

## 2021-03-22 PROCEDURE — 73110 X-RAY EXAM OF WRIST: CPT

## 2021-03-22 PROCEDURE — L3809 WHFO W/O JOINTS PRE OTS: HCPCS | Performed by: PHYSICIAN ASSISTANT

## 2021-03-22 PROCEDURE — 99024 POSTOP FOLLOW-UP VISIT: CPT | Performed by: PHYSICIAN ASSISTANT

## 2021-03-22 NOTE — PROGRESS NOTES
Patient here for post op check left distal radius ORIF. DOS 03/03/2021. Patient states that she has no pain, just a dull ache/discomfort. Patient has no new concerns for today.             Electronically signed by Krystyna Tarango MA on 3/22/2021 at 3:20 PM

## 2021-03-22 NOTE — PATIENT INSTRUCTIONS
Future Appointments   Date Time Provider Emily Mclain   4/13/2021  8:30 AM SCHEDULE, [facility]   5/27/2021 12:45 PM DO Jerome [facility]

## 2021-03-22 NOTE — PROGRESS NOTES
Chief Complaint   Patient presents with    Post-Op Check     left distal radius orif       OP:SURGEON: Dr. Leyda Monacda DO  DATE OF PROCEDURE: 3/3/21  PROCEDURE: LEFT DISTAL RADIUS INTRAARTICULAR FRACTURE OPEN REDUCTION INTERNAL FIXATION     Subjective:  Genny Elder is approximately 2 weeks about her surgery. She nonweightbearing the left upper extremity and she has maintained her splint without issues. Sutures still intact. Denies fever, chills, malaise. No new injuries or any other Ortho complaints today. Pain is minimal and patient is taking over-the-counter analgesics as well as icing and elevating. She is not currently in occupational therapy. Review of Systems -    General ROS: negative for - chills, fatigue, fever or night sweats  Respiratory ROS: no cough, shortness of breath, or wheezing  Cardiovascular ROS: no chest pain or dyspnea on exertion  Gastrointestinal ROS: no abdominal pain, nausea, vomiting, diarrhea, constipation,or black or bloody stools  Genitourinary: no hematuria, dysuria, or incontinence   Musculoskeletal ROS: negative for -back or neck pain or stiffness, also see HPI  Neurological ROS: no TIA or stroke symptoms       Objective:    General: Alert and oriented X 3, normocephalic atraumatic, external ears and eye normal, sclera clear, no acute distress, respirations easy and unlabored with no audible wheezes, skin warm and dry, speech and dress appropriate for noted age, affect euthymic.     Extremity:  Left Upper Extremity  Skin is clean dry and intact  Mild edema noted about the wrist and hand  Radial pulse palpable, fingers warm with BCR  Flex/extension intact to wrist, thumb and fingers  Finger opposition intact  Finger adduction/abduction intact  Finger crossover intact  Subjectively states sensation intact to radial/medial/ulnar distribution  Incision well approximated with no redness, drainage or warmth, suture intact  Mild TTP about the wrist mostly to radial aspect Lacking about 1 cm from tip of fingers to palm in making full fist   Mildly tender to passive wrist flexion and extension       Ht 5' 3\" (1.6 m)   Wt 236 lb (107 kg)   BMI 41.81 kg/m²     XR: L wrist   Impression   Anatomic alignment of radial fracture fragments following ORIF.  Ulnar   styloid avulsion noted as well.                   Assessment:   Diagnosis Orders   1. Other closed intra-articular fracture of distal end of left radius, initial encounter  XR WRIST LEFT (MIN 3 VIEWS)    [facility]       Plan:   Reviewed x-rays with patient today in office    Sutures removed today in office Steri-Strips placed.  Nonweightbearing left upper extremity   Transition from splint to Velcro wrist brace today. Maintain gentle active range of motion of the fingers and thumb   Occupational Therapy order sent today to [facility] location   Continue over-the-counter analgesics if needed and ice and elevation    Follow up in 4 weeks with XR of the L wrist     Electronically signed by Allen Pascal PA-C on 3/22/2021 at 3:58 PM  Note: This report was completed using [employer] voiced recognition software. Every effort has been made to ensure accuracy; however, inadvertent computerized transcription errors may be present.

## 2021-03-23 ENCOUNTER — TELEPHONE (OUTPATIENT)
Dept: ORTHOPEDIC SURGERY | Age: 36
End: 2021-03-23

## 2021-03-23 NOTE — TELEPHONE ENCOUNTER
[facility] called stating patient has [employer] so a prior Louvella Standing is required for Physical therapy, please and thank you.

## 2021-03-29 ENCOUNTER — EVALUATION (OUTPATIENT)
Dept: OCCUPATIONAL THERAPY | Age: 36
End: 2021-03-29
Payer: MEDICAID

## 2021-03-29 DIAGNOSIS — S52.572A OTHER CLOSED INTRA-ARTICULAR FRACTURE OF DISTAL END OF LEFT RADIUS, INITIAL ENCOUNTER: Primary | ICD-10-CM

## 2021-03-29 PROCEDURE — 97165 OT EVAL LOW COMPLEX 30 MIN: CPT | Performed by: OCCUPATIONAL THERAPIST

## 2021-03-29 PROCEDURE — 97110 THERAPEUTIC EXERCISES: CPT | Performed by: OCCUPATIONAL THERAPIST

## 2021-03-29 PROCEDURE — 97530 THERAPEUTIC ACTIVITIES: CPT | Performed by: OCCUPATIONAL THERAPIST

## 2021-03-29 NOTE — PROGRESS NOTES
[facility] OT  [address] [address]  Dept: [phone]   Vinnie Gongora [facility] OT Fax: [phone]    Date:  3/29/2021  Initial Evaluation Date: 3/29/2021   Evaluating Therapist: Lakeisha Campbell S/OTNannette    Patient Name:  Candelaria Peterson    :  1985    Restrictions/Precautions:  NWB on Left Upper Extremity; Distal Radius protocol at the 2 week mary; ROM, Edema Control, Scar Management, Desensitization Therapy, Home Exercise Plan and Modalities as needed  Diagnosis:  S52.572A (ICD-10-CM) - Other closed intra-articular fracture of distal end of left radius, initial encounter       Insurance/Certification information:  [facility]  Referring Practitioner:  Gaye Echols PA-C; Dr. Florence Nair  Date of Surgery/Injury: 3/3/2021 (3.5 weeks post-op)  Plan of care signed (Y/N): N  Visit# / total visits: -    Past Medical History:   Past Medical History:   Diagnosis Date    Anxiety     Depression     Pulmonary embolism ([facility] [address]      Past Surgical History:   Past Surgical History:   Procedure Laterality Date    TONSILLECTOMY      WRIST FRACTURE SURGERY Left 3/3/2021    LEFT DISTAL RADIUS OPEN REDUCTION INTERNAL FIXATION performed by DO Jerome at [address] [place] [address]       Reason for Referral: Pt is a pleasant 36 y.o. female presenting to outpatient occupational therapy s/p Left distal radius fracture with ORIF on 3/3/2021. Pt presents today for evaluation and treatment following a distal radius protocol as referred from Gaye Echols PA-C and Dr. Florence Nair.     Home Living: ***  Prior Level of Function: ***    Cognition:   Alert/Oriented x3     IADL STATUS:   Ind Mod I Min A Mod A Max A Dep Other   Homemaking Responsibility          Shopping Responsibility:          Mode of Transportation:          Leisure & Hobbies:          Work:            Comments:    ADL STATUS:   Ind Mod I Min A Mod A Max A Dep Other   Feeding:          Grooming:          Bathing:          UE Dressing:          LE Dressing: Toileting:          Transfers:            Comments:    Pain Level: {FINDINGS; PAIN SEVERITY:47864}, {pain quality:26689}    UE Assessment:  ***  Pt presents to session with L thumb spica splint donned and moderate swelling demonstrated in L wrist and composite MCPS. Demonstrated ability to complete a full fist and opposition to all digits in [facility] 96. reports increased pain and stiffness with these movements. Pt reports severe pain with movement of ulnar side of hand with tenderness noted in dorsal hand. Pt is limited with functional use of RUE due to limited ROM, strength, endurance, fine motor coordination and moderate edema with severe pain reported.  Pt would benefit from skilled OT services to improve functional use of RUE during ADL's/IADLs by improving strength, endurance, fine motor coordination and decreasing pain/edema.   · Velcro wrist brace today. Maintain gentle active range of motion of the fingers and thumb    Comment: Hand Dominance is ***    Sensation: ***    Edema Description/Circumferential Measurements:   ***  Dynamometer (setting 2): TBA     Left: TBA    Right: TBA    Pinch Meter:   Lateral: Left= TBA,Right= TBA    Palmar 3 point: Left= TBA, Right= TBA  9 Hole Peg Test:   Left: ***   Right: ***    QuickDASH Score: ***% disability    Intervention: ***     Pt educated on use of modalities for edema/pain management and before exercises to decrease stiffness (ice & heat). Pt issued HEP with tendon glides, finger pumps, and gentle wrist in all planes with handout (x10 each 2-3x/day). Education to doff splint and assess whether pain and stiffness increases/decreases and report back next visit.  Pt educated to not use LUE in resistive tasks and POC reviewed with demonstration of fair carryover of exercises & education.     Assessment of current deficits   Functional mobility [] ADLs [] Strength [x]  Cognition []  Functional transfers  [] IADLs [] Safety Awareness []  Endurance [x]  Fine Motor Coordination [x] Balance [] Vision/perception [] Sensation []   Gross Motor Coordination [] ROM [x] Pain [x]  Edema [x]      Eval Complexity: low  Profile and History- chart and history review  Assessment of Occupational Performance and Identification of Deficits- 6 deficits   Clinical Decision Making- No additional modifications required    Rehab Potential:                                 [x] Good  [] Fair  [] Poor        Suggested Professional Referral:       [x] No  [] Yes:  Barriers to Goal Achievement[de-identified]          [x] No  [] Yes:  Domestic Concerns:                           [x] No  [] Yes:     Goal Formulation: Patient  Time In: 2:00 pm            Time Out: 3:00 pm                      Timed Code Treatment Minutes: 40 minutes     PLAN      Treatment to include:   [x] Instruction in HEP                   Modalities:  [x] Therapeutic Exercise        [x] Ultrasound               [x] Electrical Stimulation/Attended  [x] PROM/Stretching                    [x] Fluidotherapy          [x]  Paraffin                   [x] AAROM  [x] AROM                 [x] Iontophoresis: 4 mg/mL;  Dexamethasone Sodium                                        [] Neuromuscular Re-education [x] Splinting         [x] Desensitization          [x] Therapeutic Activity       [x] Pain Management with/without modalities PRN                 [x] Manual Therapy/Fascial release                            [x] Tendon Glides        [x]Joint Protection/Training  []Ergonomics                             [x] Joint Mobilization        [x] Adaptive Equipment Assessment/Training                             [x] Manual Edema Mobilization    [] Energy Conservation/Work Simplification  [x] GM/FM Coordination       [] Safety retraining/education per  individual diagnosis/goals  [x] Scar Management        [] ADL/IADL re-training       Patient Specific Goal: ***                             GOALS (Long term same as Short term):  ***) Patient will demonstrate good understanding of home program (exercises/activities/diagnosis/prognosis/goals) with good accuracy. ***) Patient will demonstrate increased active/passive range of motion of their ***to [facility] for ADL/IADL completion. ***) Patient will demonstrate increased /pinch strength of at least 10 / 5 pinch pounds of their ***hand.   ***) Patient to report decreased pain in their affected *** upper extremity from ***/10 to ***/10 or less with resistive functional use. ***) Increase in fine motor function as evidenced by decreased time to complete 9-hole peg test and/or MRMT test by at least 5-10 seconds. ***) Patient to report 100% compliance with their splint wear, care, and precautions if needed. ***) Patient to report a decrease in hypersensitivity from *** to 20% or less in their ***.   ***) Patient to demonstrate decreased guarding of their affected extremity from 100% to 20% or less. ***) Patient will be knowledgeable of edema control techniques as evident with decreases from *** to mild/none. ***) Patient will demonstrate a non-tender/non-adherent scar. ***) Patient will report ADL functions as Mod I/I using ***.   ***) Patient will demonstrate improved functional activity tolerance from *** to *** for ADL/IADL completion. ***) Patient will decrease QuickDASH score to ***% or less for increased participation in daily functional activities. ***) Patient/caregiver to demonstrate proper follow through of home modification/adaptive recommendations to increase safe functional ADLs. ***) Patient will demonstrate/report proper assimilation of compensatory techniques to complete ADLs and IADLs. Patient.  Education:  [x] Plans/Goals, Risks/Benefits discussed  [x] Home exercise program  Method of Education: [x] Verbal  [x] Demo  [x] Written  Comprehension of Education:  [x] Annie understanding. [x] Demonstrates understanding. [x] Needs Review. [] Demonstrates/verbalizes understanding of HEP/Ed previously given. Patient understands diagnosis/prognosis and consents to treatment, plan and goals: [x] Yes    [] No      Electronically signed by: Linda Rivera S/OT, Elissa Contreras MS, OTR/L #[id number]       Physician's Certification / Comments      Frequency/Duration 2-3 / week for 12-18 visits. Certification period From: 3/29/2021  To: 6/21/2021     I have reviewed the Plan of Care established for skilled therapy services and certify that the services are required and that they will be provided while the patient is under my care. Physician's Comments/Revisions:                   Physicians's Printed Name:  Christian Jeter PA-C                                   Physician's Signature:                                                               Date:       Please review Patient's OT evaluation and if you agree sign/date and fax back to us at our [address] [place] [address] and [facility] OT Fax: [phone].  Thank you for your referral!

## 2021-03-29 NOTE — PROGRESS NOTES
[facility] OT  [address]  [place] [address]  Dept: [phone]   [facility] Fax: [phone]    Date:  3/29/2021  Initial Evaluation Date: 2021  Evaluating Therapist: Annalise Gutierrez    Patient Name:  Rohit Joyce    :  1985    Restrictions/Precautions:  Per Distal Radius ORIF Protocol (begin at 2 wk mary); NWB L UE; ROM edema control, scar management, desensitization, HEP, modalities PRN  Diagnosis:  L Distal Radius ORIF; S52.572A (ICD-10-CM) - Other closed intra-articular fracture of distal end of left radius, initial encounter  Insurance/Certification information: [address]  Referring Practitioner:  Dariela Chisholm PA-C  Date of Surgery/Injury: 2021 sx  Plan of care signed (Y/N):  N  Certification: Thru 21  Visit# / total visits:     Past Medical History:   Past Medical History:   Diagnosis Date    Anxiety     Depression     Pulmonary embolism ([facility] [address]      Past Surgical History:   Past Surgical History:   Procedure Laterality Date    TONSILLECTOMY      WRIST FRACTURE SURGERY Left 3/3/2021    LEFT DISTAL RADIUS OPEN REDUCTION INTERNAL FIXATION performed by Marie Reyes DO at [address] [place] [address]       Reason for Referral: Pt is a pleasant 29year old female presenting to outpatient occupational therapy s/p L distal radius ORIF on 3/3/21. Ulnar styloid avulsion noted as well. She also  DOI: 21 the pt fell on an outstretched hand while sledding. She went to ED immediately where she was diagnosed with an intra-articular distal radius fx, underwent closed reduction, and was placed in a sugar tone splint. She was referred to ortho where surgical intervention was pursued. Sutures were removed and a velcro wist cock-up brace was issued on 3/22.  Overall doing well with no pain - only reports of discomfort in the wrist. Was anxious about gentle AROM at the wrist for measurements - however, this was quickly reduced with therapeutic intervention. Pt presents today for OT evaluation and treatment. Home Living: Pt lives at home alone. Prior Level of Function: Independent    Cognition:   Alert/Oriented x3    IADL STATUS:   Ind Mod I Min A Mod A Max A Dep Other   Homemaking Responsibility  x        Shopping Responsibility:  x        Mode of Transportation:  x        Leisure & Hobbies:       x   Work:       x     Comments: Performing all tasks mainly using dominant R hand and ^'d difficulty. 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 ADL STATUS:   Ind Mod I Min A Mod A Max A Dep Other   Feeding:  x        Grooming:  x        Bathing:  x        UE Dressing:  x        LE Dressing:  x        Toileting:  x        Transfers:  x          Comments: Performing all tasks mainly using dominant R - reports guarding for majority of all tasks. Pain Level: Discomfort in the mornings after waking up in the morning. UE Assessment: L shoulder and elbow ROM and MMG's are WFLs. Pt is able to make a full fist (slight stiffness) and has full opposition. ROM limitations in wrist as shown below. Moderate edema in proximal palm of hand and wrist to mid forearm. Incision healed well with no signs of infections. Fully closed with no sites for drainage. Dense scar tissue and tenderness with palpation around the scar. Will assess strength at 6 weeks per protocol - however, weakness evident in hand s/p surgical intervention. Good FM skills - but will have difficulty depending on the amount of ROM required from the wrist. Reports constantly guarding hand. Brace was donned properly with proper fit. Pt will benefit from skilled OT services to improve ROM/strength, reduce edema/discomfort, initiate scar management, and enhance the overall functional use of the L UE following surgical intervention.        Left Upper Extremity ROM  /  KEY: Ext/Flex     AROM(PROM)   Forearm Pronation: 0/76-84* WFLs      Supination: 0/80* 0/52*       Wrist Flexion: 0/60-80* 0/43*      Extension: 0/60-75* 0/40*      Radial Deviation: 0/20-25* 0/15*      Ulnar Deviation: 0/30-45* 0/28*       Comment: Hand Dominance is Right. Sensation: None reported    Edema Description/Circumferential Measurements:   Wrist Distal to Ulnar Styloid: 16.9 cm R   &   18 cm L  Dynamometer (setting 2): May assess at 6 weeks s/p sx or after     Left: TBA      Right: TBA    Pinch Meter:   Lateral: Left= TBA, Right= TBA    Palmar 3 point: Left= TBA, Right= TBA  9 Hole Peg Test:   Left: 22 sec   Right: 19 sec    QuickDASH Score: 59.1% disability    Intervention: Completed education on diagnosis, prognosis, protocol, and current restrictions. No pushing, pulling, lifting, or resistive tasks at this time. Review brace wear and care. May remove brace for exercises and light activities such as holding laundry or using to help wash hair. To continue with wear at all other times. Educated on edema and pain management techniques (retrograde massage, elevation, heat vs ice). Fitted with size D compression sleeve for edema. Removed steri strips and demonstrated scar management manipulation. Initiated AROM of the wrist in all planes (flex/ext, RD/UD, pro/sup) and tendon glides all positions with handouts provided. To complete 2-3x/day at 10-15 reps ea ex with 5 sec holds. Pt demonstrated excellent understanding throughout her session. Will continue to add to her HEP in further sessions.      Assessment of current deficits   Functional mobility []  ADLs [x] Strength [x]  Cognition []  Functional transfers  [] IADLs [x] Safety Awareness []  Endurance [x]  Fine Motor Coordination [x] Balance [] Vision/perception [] Sensation [x]   Gross Motor Coordination [] ROM [x] Pain [x]  Edema [x]      Eval Complexity: Low Complexity  Profile and History- Brief review of chart and history  Assessment of Occupational Performance and Identification of Deficits- 8 deficits identified  Clinical Decision Making- None required    Rehab Potential:                                 [x] Good  [] Fair  [] Poor        Suggested Professional Referral:       [x] No  [] Yes:  Barriers to Goal Achievement[de-identified]           [x] No  [] Yes:  Domestic Concerns:                           [x] No  [] Yes:     Goal Formulation: Patient  Time In: 1:15 pm            Time Out: 2:00 pm                      Timed Code Treatment Minutes: 30 minutes   1-TE, 1-TA     PLAN      Treatment to include:   [x] Instruction in HEP                   Modalities:  [x] Therapeutic Exercise        [x] Ultrasound               [x] Electrical Stimulation/Attended  [x] PROM/Stretching                    [x] Fluidotherapy          [x]  Paraffin                   [x] AAROM  [x] AROM                 [x] Iontophoresis: 4 mg/mL; Dexamethasone Sodium                                        [] Neuromuscular Re-education [x] Splinting         [x] Desensitization          [x] Therapeutic Activity       [x] Pain Management with/without modalities PRN                 [x] Manual Therapy/Fascial release                            [x] Tendon Glides        []Joint Protection/Training  [x]Ergonomics                             [] Joint Mobilization        [] Adaptive Equipment Assessment/Training                             [x] Manual Edema Mobilization    [x] Energy Conservation/Work Simplification  [x] GM/FM Coordination       [x] Safety retraining/education per  individual diagnosis/goals  [x] Scar Management        [x] ADL/IADL re-training       Patient Specific Goal: To improve the motion of the wrist.                             GOALS (Long term same as Short term):  1) Patient will demonstrate good understanding of home program (exercises/activities/diagnosis/prognosis/goals) with good accuracy.    2) Patient will demonstrate increased active/passive range of motion of their L

## 2021-04-05 ENCOUNTER — TREATMENT (OUTPATIENT)
Dept: OCCUPATIONAL THERAPY | Age: 36
End: 2021-04-05
Payer: MEDICAID

## 2021-04-05 DIAGNOSIS — S52.572A OTHER CLOSED INTRA-ARTICULAR FRACTURE OF DISTAL END OF LEFT RADIUS, INITIAL ENCOUNTER: Primary | ICD-10-CM

## 2021-04-05 PROCEDURE — 97022 WHIRLPOOL THERAPY: CPT | Performed by: OCCUPATIONAL THERAPIST

## 2021-04-05 PROCEDURE — 97140 MANUAL THERAPY 1/> REGIONS: CPT | Performed by: OCCUPATIONAL THERAPIST

## 2021-04-05 PROCEDURE — 97110 THERAPEUTIC EXERCISES: CPT | Performed by: OCCUPATIONAL THERAPIST

## 2021-04-05 NOTE — PROGRESS NOTES
OCCUPATIONAL THERAPY PROGRESS NOTE    Date:  2021  Initial Evaluation Date: 2021              Evaluating Therapist: Wendy Mendoza     Patient Name:  John Simons               :  1985     Restrictions/Precautions:  Per Distal Radius ORIF Protocol (begin at 2 wk mary); NWB L UE; ROM edema control, scar management, desensitization, HEP, modalities PRN  Diagnosis:  L Distal Radius ORIF; S52.572A (ICD-10-CM) - Other closed intra-articular fracture of distal end of left radius, initial encounter  Insurance/Certification information: [address]  Referring Practitioner:  Porsha Ramirez PA-C  Date of Surgery/Injury: 2021 sx  Plan of care signed (Y/N): Y  Certification: Thru 21  Visit# / total visits:     Pain Level: 3-4/10 pain, aching, tight (pulling) and uncomfortable    Subjective: Pt reported \"It doesn't really hurt, just some discomfort. \"     Objective:  Updated POC to be completed by 10th session. INTERVENTION: COMPLETED: SPECIFICS/COMMENTS:   Modality:     Fluidotherapy  x 15 min with AROM of the wrist/digits for soft tissue warm-up and reduction of pain   MHP     AROM/AAROM:     Wrist AROM All Planes x 10 reps ea - reviewed for HEP   Ball AAROM w/ Wrist x Roll ball forward and backward (flex/ext), side to side (RD/UD), and under/over (pro/sup) x 10   Wristerciser x 8 reps with elbow on table   Towel Exercises x Hand scrunches to improve tendon gliding and reduce stiffness   PROM/Stretching:     Wrist PROM All Planes x Gentle w/ prolonged holds   Prayer Stretches x 12 reps - added to HEP   Scar Mass/Edema Control:     Retrograde/Soft Tissue x To hand and wrist to reduce edema and increase soft tissue elasticity   Scar Manipulation x To reduce scar tissue and increase elasticity   Therapeutic Activity:               Strengthening:               Other:                 Assessment/Comments: Pt is making Fair + progress toward stated plan of care.  Pt demonstrated good tolerance for all active ROM exercises - however, she reported fatigue in the end of each exercise. No pain - only mild discomfort with stretching and active movement. ROM in wrist improving nicely and edema reducing well. Reviewed HEP and activity level with good understanding.    -Rehab Potential: Good  -Requires OT Follow Up: Yes              Time In: 2:00 pm            Time Out: 3:00 pm     Treatment Charges: Mins Units   Modalities 15 1   Ther Exercise 35 2   Manual Therapy 20 1   Thera Activities     ADL/Home Mgt      Neuro Re-education     Gait Training     Group Therapy     Non-Billable Service Time     Other     Total Time/Units 60 4     -Response to Treatment: She responded well to her treatment session. Goal Progress: Goals for pt can be see on initial eval occurring on 3/29/2021. Plan:   [x]  Continues Plan of care: Treatment covered based on POC and graduated to patient's progress. Pt education continues at each visit to obtain maximum benefits from skilled OT intervention.   []  Alter Plan of care:   []  Discharge:    Carlyle Nieves [address], OTR/L #[id number]

## 2021-04-08 ENCOUNTER — TREATMENT (OUTPATIENT)
Dept: OCCUPATIONAL THERAPY | Age: 36
End: 2021-04-08
Payer: MEDICAID

## 2021-04-08 DIAGNOSIS — S52.572A OTHER CLOSED INTRA-ARTICULAR FRACTURE OF DISTAL END OF LEFT RADIUS, INITIAL ENCOUNTER: Primary | ICD-10-CM

## 2021-04-08 PROCEDURE — 97110 THERAPEUTIC EXERCISES: CPT | Performed by: OCCUPATIONAL THERAPIST

## 2021-04-08 PROCEDURE — 97018 PARAFFIN BATH THERAPY: CPT | Performed by: OCCUPATIONAL THERAPIST

## 2021-04-08 PROCEDURE — 97140 MANUAL THERAPY 1/> REGIONS: CPT | Performed by: OCCUPATIONAL THERAPIST

## 2021-04-08 NOTE — PROGRESS NOTES
OCCUPATIONAL THERAPY PROGRESS NOTE    Date:  2021  Initial Evaluation Date: 2021              Evaluating Therapist: Renato Burrell     Patient Name:  Lias Miller               :  1985     Restrictions/Precautions:  Per Distal Radius ORIF Protocol (begin at 2 wk mary); NWB L UE; ROM edema control, scar management, desensitization, HEP, modalities PRN  Diagnosis:  L Distal Radius ORIF; S52.572A (ICD-10-CM) - Other closed intra-articular fracture of distal end of left radius, initial encounter  Insurance/Certification information: [address]  Referring Practitioner:  Glenroy Chaney PA-C  Date of Surgery/Injury: 2021 sx  Plan of care signed (Y/N): Y  Certification: Thru 21  Visit# / total visits: 3 / 18    Pain Level: 1-2/10 pain, aching, tight (pulling) and uncomfortable    Subjective: Pt reported \"It's feeling pretty good, but I know when I over work it. \"     Objective:  Updated POC to be completed by 10th session.     INTERVENTION: COMPLETED: SPECIFICS/COMMENTS:   Modality:     Fluidotherapy   15 min with AROM of the wrist/digits for soft tissue warm-up and reduction of pain   Paraffin x 10 min for soft tissue and joint warm-up   MHP     AROM/AAROM:     Wrist AROM All Planes x 10 reps ea - reviewed for HEP   Ball AAROM w/ Wrist x Roll ball forward and backward (flex/ext), side to side (RD/UD), and under/over (pro/sup) x 10   Wristerciser x 8 reps with elbow on table   Sup/Pro Tasks x Using yellow pinch pin - pinch beads in pronation and supinate to place in cone   Towel Exercises x Hand scrunches to improve tendon gliding and reduce stiffness   PROM/Stretching:     Wrist PROM All Planes x Gentle w/ prolonged holds   Prayer Stretches x 12 reps - added to HEP   Scar Mass/Edema Control:     Retrograde/Soft Tissue x To hand and wrist to reduce edema and increase soft tissue elasticity   Scar Manipulation x To reduce scar tissue and increase elasticity   Therapeutic Activity:     FM Tasks x -Opp pinching marbles to place into slot with wrist flexion        Strengthening:               Other:                 Assessment/Comments: Pt is making Fair + progress toward stated plan of care. Pt demonstrating improved ROM in the wrist this date. Passive wrist flex/ext estimate 60*. Pain continues to reduce. Demonstrates good- tolerance for all activities today. Reported mild occasional discomfort during session but no pain. Reduced edema noted. -Rehab Potential: Good  -Requires OT Follow Up: Yes              Time In: 4:00 pm            Time Out: 4:55 pm     Treatment Charges: Mins Units   Modalities 10 1   Ther Exercise 25 2   Manual Therapy 20 1   Thera Activities     ADL/Home Mgt      Neuro Re-education     Gait Training     Group Therapy     Non-Billable Service Time     Other     Total Time/Units 55 4     -Response to Treatment: She responded well to her treatment session. Goal Progress: Goals for pt can be see on initial eval occurring on 3/29/2021. Plan:   [x]  Continues Plan of care: Treatment covered based on POC and graduated to patient's progress. Pt education continues at each visit to obtain maximum benefits from skilled OT intervention.   []  Alter Plan of care:   []  Discharge:    Elyn Kayser Luite [address], OTR/L #[id number]

## 2021-04-12 ENCOUNTER — TREATMENT (OUTPATIENT)
Dept: OCCUPATIONAL THERAPY | Age: 36
End: 2021-04-12
Payer: MEDICAID

## 2021-04-12 DIAGNOSIS — S52.572A OTHER CLOSED INTRA-ARTICULAR FRACTURE OF DISTAL END OF LEFT RADIUS, INITIAL ENCOUNTER: Primary | ICD-10-CM

## 2021-04-12 PROCEDURE — 97140 MANUAL THERAPY 1/> REGIONS: CPT | Performed by: OCCUPATIONAL THERAPIST

## 2021-04-12 PROCEDURE — 97018 PARAFFIN BATH THERAPY: CPT | Performed by: OCCUPATIONAL THERAPIST

## 2021-04-12 PROCEDURE — 97110 THERAPEUTIC EXERCISES: CPT | Performed by: OCCUPATIONAL THERAPIST

## 2021-04-12 NOTE — PROGRESS NOTES
OCCUPATIONAL THERAPY PROGRESS NOTE    Date:  2021  Initial Evaluation Date: 2021              Evaluating Therapist: Farzana Tanner     Patient Name:  Kalyin Tamez               :  1985     Restrictions/Precautions:  Per Distal Radius ORIF Protocol (begin at 2 wk mary); NWB L UE; ROM edema control, scar management, desensitization, HEP, modalities PRN  Diagnosis:  L Distal Radius ORIF; S52.572A (ICD-10-CM) - Other closed intra-articular fracture of distal end of left radius, initial encounter  Insurance/Certification information: [address]  Referring Practitioner:  Wilfredo Mroan PA-C  Date of Surgery/Injury: 2021 sx  Plan of care signed (Y/N): Y  Certification: Thru 21  Visit# / total visits:     Pain Level: 3-4/10 pain in the wrist within the last 24 hrs; uncomfortable, soreness    Subjective: Pt reported \"I actually was able to fold a whole load of laundry with no problems, and that was something I couldn't do before starting therapy. \"     Objective:  Updated POC to be completed by 10th session.     INTERVENTION: COMPLETED: SPECIFICS/COMMENTS:   Modality:     Fluidotherapy   15 min with AROM of the wrist/digits for soft tissue warm-up and reduction of pain   Paraffin x 10 min for soft tissue and joint warm-up   MHP     AROM/AAROM:     Wrist AROM All Planes  10 reps ea - reviewed for HEP   Ball AAROM w/ Wrist  Roll ball forward and backward (flex/ext), side to side (RD/UD), and under/over (pro/sup) x 10   Wristerciser x 8 reps with elbow on table   Sup/Pro Tasks x Using yellow pinch pin - pinch beads in pronation and supinate to place in cone  -Turning over playing cards  -Scooping poms with cone in pronation and supinate to dumb into container   Towel Exercises x Hand scrunches to improve tendon gliding and reduce stiffness   PROM/Stretching:     Red Web Stretches x 10 reps wrist flex/et with 10 sec holds   Wrist PROM All Planes x Gentle w/ prolonged holds Prayer Stretches x 12 reps - added to HEP   Scar Mass/Edema Control:     Retrograde/Soft Tissue x To hand and wrist to reduce edema and increase soft tissue elasticity   Scar Manipulation x To reduce scar tissue and increase elasticity   Therapeutic Activity:     FM Tasks x -Opp pinching marbles to place into slot with wrist flexion  -Boading balls (clockwise and counter)  -Opp pinching with large pegs (in/out of board)        Strengthening:               Other:                 Assessment/Comments: Pt is making Fair + progress toward stated plan of care. Significant increase in wrist ROM in a matter of 3 sessions. New measurements shown below. She does report some muscular soreness in the flexors of the forearm that is relieved with heat and soft tissue mobilization. Discussed completing ice massage for her HEP to reduce continued soreness. Overall she is doing well - she didn't report pain throughout her session but did have mild soreness following stretching with power web. Follow-up with referring physician on 4/13. Left Upper Extremity ROM  /  KEY: Ext/Flex     AROM(PROM)   Forearm Pronation: 0/76-84* WFLs         Supination: 0/80* 0/52* 0/68*         Wrist Flexion: 0/60-80* 0/43*   0/68*        Extension: 0/60-75* 0/40*  0/65*       Radial Deviation: 0/20-25* 0/15*   0/20*        Ulnar Deviation: 0/30-45* 0/28*  0/40*       -Rehab Potential: Good  -Requires OT Follow Up: Yes              Time In: 8:00 am            Time Out: 9:00 am     Treatment Charges: Mins Units   Modalities 10 1   Ther Exercise 30 2   Manual Therapy 20 1   Thera Activities     ADL/Home Mgt      Neuro Re-education     Gait Training     Group Therapy     Non-Billable Service Time     Other     Total Time/Units 60 4     -Response to Treatment: She responded well to her treatment session. Pleased with how quickly she is progressing. Goal Progress: Goals for pt can be see on initial eval occurring on 3/29/2021.      Plan:   [x]

## 2021-04-13 ENCOUNTER — OFFICE VISIT (OUTPATIENT)
Dept: ORTHOPEDIC SURGERY | Age: 36
End: 2021-04-13
Payer: MEDICAID

## 2021-04-13 ENCOUNTER — HOSPITAL ENCOUNTER (OUTPATIENT)
Dept: GENERAL RADIOLOGY | Age: 36
Discharge: HOME | End: 2021-04-15
Payer: MEDICAID

## 2021-04-13 VITALS — TEMPERATURE: 98.3 F

## 2021-04-13 DIAGNOSIS — S52.572A OTHER CLOSED INTRA-ARTICULAR FRACTURE OF DISTAL END OF LEFT RADIUS, INITIAL ENCOUNTER: ICD-10-CM

## 2021-04-13 DIAGNOSIS — S52.532D CLOSED COLLES' FRACTURE OF LEFT RADIUS WITH ROUTINE HEALING, SUBSEQUENT ENCOUNTER: Primary | ICD-10-CM

## 2021-04-13 PROCEDURE — 99024 POSTOP FOLLOW-UP VISIT: CPT | Performed by: PHYSICIAN ASSISTANT

## 2021-04-13 PROCEDURE — 99212 OFFICE O/P EST SF 10 MIN: CPT

## 2021-04-13 PROCEDURE — 73110 X-RAY EXAM OF WRIST: CPT

## 2021-04-13 NOTE — PATIENT INSTRUCTIONS
X-rays show good interval healing  Okay to progress to 3-5 pounds of weight bearing/resistance with the left wrist with brace on as long as no increased pain  Okay to progress with daily activities that are 3 pounds or less without the brace as long as no increased pain that persists  I do anticipate increased muscle soreness and mild increase in swelling after increased activity    Continue with ice and elevation if needed, over-the-counter Tylenol or ibuprofen as needed    We will send updates to occupational therapy today.   They will progress you further than the above outlined restrictions  By postop week 8/9 we start weaning out of the brace for daily activities    Plan for follow up in 5-6 weeks

## 2021-04-13 NOTE — PROGRESS NOTES
Chief Complaint   Patient presents with    Follow-up     6wk post-op L distal radius ORIF. Pain 4/10, denies numbness or tingling, fever or chills. Good ROM with wrist and fingers. Presents with velcro wrist brace. Doing outpatient therapy in [place] [address] (Marianne Maldonado.)    Other     XR in EPIC        OP:SURGEON: Dr. Elba Wagner, DO  DATE OF PROCEDURE: 3/3/21  PROCEDURE: LEFT DISTAL RADIUS INTRAARTICULAR FRACTURE OPEN REDUCTION INTERNAL FIXATION    POD: 6 weeks    Subjective:  Daria Pruett is following up from the above surgery. She is NWB on left upper extremity. She has maintained velcro wrist brace. Has had some increased discomfort to the distal forearm with increased activity with therapy. Patient has increased activity at home as well, ie folding laundry. Pain to extremity is 4 on a scale of 1 - 10 and is not taking prescribed pain medication, OTC PRN. They denies numbness, tingling, weakness to the left upper extremity. Denies calf pain, chest pain, or shortness of breath. Patient is participating in therapy, outpatient therapy. Denies any new falls/injuries. Review of Systems -    General ROS: negative for - chills, fatigue, fever or night sweats  Respiratory ROS: no cough, shortness of breath, or wheezing  Cardiovascular ROS: no chest pain or dyspnea on exertion  Gastrointestinal ROS: no abdominal pain, nausea, vomiting, diarrhea, constipation,or black or bloody stools  Genitourinary: no hematuria, dysuria, or incontinence   Musculoskeletal ROS: negative for -back or neck pain or stiffness, also see HPI  Neurological ROS: no TIA or stroke symptoms       Objective:    General: Alert and oriented X 3, normocephalic atraumatic, external ears and eye normal, sclera clear, no acute distress, respirations easy and unlabored with no audible wheezes, skin warm and dry, speech and dress appropriate for noted age, affect euthymic.     Extremity:  Left Upper Extremity  Skin is clean dry and intact   No evidence of discoloration or skin breakdown  mild edema noted at the wrist  Palpable Radial pulse, fingers warm with BCR  Flex/extension intact to wrist, thumb and fingers   Finger opposition intact  Finger adduction/abduction intact  Finger crossover intact  able to make concentric fist  Does have some increased discomfort over the flexor surface of the distal forearm  No tenderness to palpation at fracture site  Subjectively states sensation intact to Median Nerve, Ulnar Nerve and Radial Nerve distribution  Incision well healed without hypertrophic or keloid changes. No hypersensitivity to incision line  Active Wrist extension to approximately 45 degrees, wrist flexion to approximately 50 degrees on exam today    Temp 98.3 °F (36.8 °C)     XR:   3 views of the left wrist are obtained today. This demonstrates stable fixation to the distal radius. There is no evidence of hardware failure or loosening. No interval change in fracture alignment. Fracture lines do remain visible but improved healing is noted. There is callus formation appreciated on the lateral view to the dorsum of the radius and on the oblique appears to be increased callus formation at the cortex. Assessment:   Diagnosis Orders   1. Closed Colles' fracture of left radius with routine healing, subsequent encounter         Plan:  Reviewed x-rays with patient today in office  X-rays show good interval healing  Okay to progress to 3-5 pounds of weight bearing/resistance with the left wrist with brace on as long as no increased pain  Okay to progress with daily activities that are 3 pounds or less without the brace as long as no increased pain that persists  I do anticipate increased muscle soreness and mild increase in swelling after increased activity    Continue with ice and elevation if needed, over-the-counter Tylenol or ibuprofen as needed    We will send updates to occupational therapy today.   They will progress you further than the above outlined

## 2021-04-16 ENCOUNTER — TREATMENT (OUTPATIENT)
Dept: OCCUPATIONAL THERAPY | Age: 36
End: 2021-04-16
Payer: MEDICAID

## 2021-04-16 DIAGNOSIS — S52.572A OTHER CLOSED INTRA-ARTICULAR FRACTURE OF DISTAL END OF LEFT RADIUS, INITIAL ENCOUNTER: Primary | ICD-10-CM

## 2021-04-16 PROCEDURE — 97140 MANUAL THERAPY 1/> REGIONS: CPT | Performed by: OCCUPATIONAL THERAPIST

## 2021-04-16 PROCEDURE — 97022 WHIRLPOOL THERAPY: CPT | Performed by: OCCUPATIONAL THERAPIST

## 2021-04-16 PROCEDURE — 97110 THERAPEUTIC EXERCISES: CPT | Performed by: OCCUPATIONAL THERAPIST

## 2021-04-16 NOTE — PROGRESS NOTES
OCCUPATIONAL THERAPY PROGRESS NOTE    Date:  2021  Initial Evaluation Date: 2021              Evaluating Therapist: Melinda Correa     Patient Name:  Tanvir Pitts               :  1985     Restrictions/Precautions:  Per Distal Radius ORIF Protocol (begin at 2 wk mary); NWB L UE; ROM edema control, scar management, desensitization, HEP, modalities PRN  Diagnosis:  L Distal Radius ORIF; S52.572A (ICD-10-CM) - Other closed intra-articular fracture of distal end of left radius, initial encounter  Insurance/Certification information: [address]  Referring Practitioner:  Baylee Orellana PA-C  Date of Surgery/Injury: 2021 sx  Plan of care signed (Y/N): Y  Certification: Thru 21  Visit# / total visits:     Pain Level: 3-4/10 pain in the wrist within the last 24 hrs; uncomfortable, soreness    Subjective: Pt reported \"The doctors cleared me for strengthening! \"     Objective:  Updated POC to be completed by 10th session.     INTERVENTION: COMPLETED: SPECIFICS/COMMENTS:   Modality:     Fluidotherapy  x 15 min with AROM of the wrist/digits for soft tissue warm-up and reduction of pain   Paraffin  10 min for soft tissue and joint warm-up   MHP     AROM/AAROM:     Wrist AROM All Planes x 10 reps ea - reviewed for HEP   Ball AAROM w/ Wrist  Roll ball forward and backward (flex/ext), side to side (RD/UD), and under/over (pro/sup) x 10   Wristerciser x 8 reps with elbow on table   Sup/Pro Tasks x Using yellow pinch pin - pinch beads in pronation and supinate to place in cone  -Turning over playing cards  -Scooping poms with cone in pronation and supinate to dumb into container   Towel Exercises x Hand scrunches to improve tendon gliding and reduce stiffness   PROM/Stretching:     Red Web Stretches x 10 reps wrist flex/et with 10 sec holds   Wrist PROM All Planes x Gentle w/ prolonged holds   Prayer Stretches x 12 reps - added to HEP   Scar Mass/Edema Control: Retrograde/Soft Tissue x To hand and wrist to reduce edema and increase soft tissue elasticity   Scar Manipulation x To reduce scar tissue and increase elasticity   Therapeutic Activity:     FM Tasks x -Opp pinching marbles to place into slot with wrist flexion  -Boading balls (clockwise and counter)  -Opp pinching with large pegs (in/out of board)        Strengthening:     Light putty x Gripping, rolling, pinching alternating fingers        Other:     Silicone x Silicone gel pad issued to decrease rigidity    Putty x Issued for home     Assessment/Comments: Pt is making Fair + progress toward stated plan of care. Pt cleared for strengthening activities from doctors on 4/13/2021. Pt tolerated light strengthening with putty this date well. Will increase as tolerated. Dynamometer (setting 2):  cleared for light strengthening on 4/13/2021                                Left: 46#                                              Right: 71#                  Pinch Meter:              Lateral: Left= 15#, Right= 17#               Palmar 3 point: Left= 11#, Right= 15#    -Rehab Potential: Good  -Requires OT Follow Up: Yes              Time In: 9:00 am            Time Out: 10:00 am     Treatment Charges: Mins Units   Modalities 15 1   Ther Exercise 25 2   Manual Therapy 20 1   Thera Activities     ADL/Home Mgt      Neuro Re-education     Gait Training     Group Therapy     Non-Billable Service Time     Other     Total Time/Units 60 4     -Response to Treatment: She responded well to her treatment session. Pleased with how quickly she is progressing. Goal Progress: Goals for pt can be see on initial eval occurring on 3/29/2021. Plan:   [x]  Continues Plan of care: Treatment covered based on POC and graduated to patient's progress. Pt education continues at each visit to obtain maximum benefits from skilled OT intervention. []  Alter Plan of care:   []  Discharge:     Ashlee Rashid 35, OTR/L #[id number]

## 2021-04-19 ENCOUNTER — TREATMENT (OUTPATIENT)
Dept: OCCUPATIONAL THERAPY | Age: 36
End: 2021-04-19
Payer: MEDICAID

## 2021-04-19 DIAGNOSIS — S52.572A OTHER CLOSED INTRA-ARTICULAR FRACTURE OF DISTAL END OF LEFT RADIUS, INITIAL ENCOUNTER: Primary | ICD-10-CM

## 2021-04-19 PROCEDURE — 97140 MANUAL THERAPY 1/> REGIONS: CPT | Performed by: OCCUPATIONAL THERAPIST

## 2021-04-19 PROCEDURE — 97022 WHIRLPOOL THERAPY: CPT | Performed by: OCCUPATIONAL THERAPIST

## 2021-04-19 PROCEDURE — 97110 THERAPEUTIC EXERCISES: CPT | Performed by: OCCUPATIONAL THERAPIST

## 2021-04-19 NOTE — PROGRESS NOTES
OCCUPATIONAL THERAPY PROGRESS NOTE    Date:  2021  Initial Evaluation Date: 2021              Evaluating Therapist: Mckenna Diaz     Patient Name:  Sabrina Beckett               :  1985     Restrictions/Precautions:  Per Distal Radius ORIF Protocol (begin at 2 wk mary); NWB L UE; ROM edema control, scar management, desensitization, HEP, modalities PRN  Diagnosis:  L Distal Radius ORIF; S52.572A (ICD-10-CM) - Other closed intra-articular fracture of distal end of left radius, initial encounter  Insurance/Certification information: [address]  Referring Practitioner:  Drew Costello PA-C  Date of Surgery/Injury: 2021 sx  Plan of care signed (Y/N): Y  Certification: Thru 21  Visit# / total visits:     Pain Level: 3-4/10 pain in the wrist within the last 24 hrs; uncomfortable, soreness    Subjective: Pt reports that she fell this weekend but not directly on her hand but she was experiencing increased discomfort over the weekend. Objective:  Updated POC to be completed by 10th session.     INTERVENTION: COMPLETED: SPECIFICS/COMMENTS:   Modality:     Fluidotherapy  x 15 min with AROM of the wrist/digits for soft tissue warm-up and reduction of pain   Paraffin  10 min for soft tissue and joint warm-up   MHP     AROM/AAROM:     Wrist AROM All Planes x 10 reps ea - reviewed for HEP   Ball AAROM w/ Wrist  Roll ball forward and backward (flex/ext), side to side (RD/UD), and under/over (pro/sup) x 10   Wristerciser x 8 reps with elbow on table   Sup/Pro Tasks x Using yellow pinch pin - pinch beads in pronation and supinate to place in cone  -Turning over playing cards  -Scooping poms with cone in pronation and supinate to dumb into container   Towel Exercises x Hand scrunches to improve tendon gliding and reduce stiffness   PROM/Stretching:     Red Web Stretches x 10 reps wrist flex/et with 10 sec holds   Wrist PROM All Planes x Gentle w/ prolonged holds   Prayer Stretches x 12 reps - added to HEP   Scar Mass/Edema Control:     Retrograde/Soft Tissue x To hand and wrist to reduce edema and increase soft tissue elasticity   Scar Manipulation x To reduce scar tissue and increase elasticity   Therapeutic Activity:     FM Tasks x -Opp pinching marbles to place into slot with wrist flexion  -Boading balls (clockwise and counter)  -Opp pinching with large pegs (in/out of board)        Strengthening:     Light putty x Gripping, rolling, pinching alternating fingers   Isometric wrist extension x Wrist extension with hands on resistance from therapist   True balance x 3 mins   Other:     Silicone x Silicone gel pad issued to decrease rigidity    Putty x Issued for home     Assessment/Comments: Pt is making Fair + progress toward stated plan of care. Pt tolerated session well with increase in strengthening activities this date-will continue to increase with pt reporting no/little increase in discomfort. Pt continues to demonstrate increased wrist extension. Will increase as tolerated. Dynamometer (setting 2): Dr virgen for light strengthening on 4/13/2021                                Left: 46#                                              Right: 71#                  Pinch Meter:              Lateral: Left= 15#, Right= 17#               Palmar 3 point: Left= 11#, Right= 15#    -Rehab Potential: Good  -Requires OT Follow Up: Yes              Time In: 3:00 pm            Time Out: 4:00 pm     Treatment Charges: Mins Units   Modalities-fluido 15 1   Ther Exercise 25 2   Manual Therapy 20 1   Thera Activities     ADL/Home Mgt      Neuro Re-education     Gait Training     Group Therapy     Non-Billable Service Time     Other     Total Time/Units 60 4     -Response to Treatment: She responded well to her treatment session. Pleased with how quickly she is progressing. Goal Progress: Goals for pt can be see on initial eval occurring on 3/29/2021.      Plan:   [x]  Continues Plan of

## 2021-04-23 ENCOUNTER — TREATMENT (OUTPATIENT)
Dept: OCCUPATIONAL THERAPY | Age: 36
End: 2021-04-23
Payer: MEDICAID

## 2021-04-23 DIAGNOSIS — S52.572A OTHER CLOSED INTRA-ARTICULAR FRACTURE OF DISTAL END OF LEFT RADIUS, INITIAL ENCOUNTER: Primary | ICD-10-CM

## 2021-04-23 PROBLEM — S52.502A CLOSED FRACTURE OF LEFT DISTAL RADIUS: Status: ACTIVE | Noted: 2021-04-23

## 2021-04-23 PROCEDURE — 97110 THERAPEUTIC EXERCISES: CPT | Performed by: OCCUPATIONAL THERAPIST

## 2021-04-23 PROCEDURE — 97140 MANUAL THERAPY 1/> REGIONS: CPT | Performed by: OCCUPATIONAL THERAPIST

## 2021-04-23 PROCEDURE — 97018 PARAFFIN BATH THERAPY: CPT | Performed by: OCCUPATIONAL THERAPIST

## 2021-04-23 NOTE — PROGRESS NOTES
OCCUPATIONAL THERAPY PROGRESS NOTE    Date:  2021  Initial Evaluation Date: 2021              Evaluating Therapist: Bernice Hernández     Patient Name:  Daria Pruett               :  1985     Restrictions/Precautions:  Per Distal Radius ORIF Protocol (begin at 2 wk mary); NWB L UE; ROM edema control, scar management, desensitization, HEP, modalities PRN  Diagnosis:  L Distal Radius ORIF; S52.572A (ICD-10-CM) - Other closed intra-articular fracture of distal end of left radius, initial encounter  Insurance/Certification information: [address]  Referring Practitioner:  Amandeep Gillis PA-C  Date of Surgery/Injury: 2021 sx  Plan of care signed (Y/N): Y  Certification: Thru 21  Visit# / total visits:     Pain Level: 3-4/10 pain in the wrist within the last 24 hrs; uncomfortable, soreness    Subjective: Pt reports \"I was sore the last time but I am doing great! \"     Objective:  Updated POC to be completed by 10th session.     INTERVENTION: COMPLETED: SPECIFICS/COMMENTS:   Modality:     Fluidotherapy   15 min with AROM of the wrist/digits to reduce soft tissue stiffness and reduction of pain   Paraffin x 10 min for soft tissue and joint warm-up   MHP     AROM/AAROM:     Wrist AROM All Planes x 10 reps ea - reviewed for HEP   Ball AAROM w/ Wrist  Roll ball forward and backward (flex/ext), side to side (RD/UD), and under/over (pro/sup) x 10   Wristerciser x 8 reps with elbow on table   Sup/Pro Tasks x Using yellow pinch pin - pinch beads in pronation and supinate to place in cone  -Turning over playing cards  -Scooping poms with cone in pronation and supinate to dumb into container   Towel Exercises x Hand scrunches to improve tendon gliding and reduce stiffness   PROM/Stretching:     Red Web Stretches x 10 reps wrist flex/et with 10 sec holds   Wrist PROM All Planes x Gentle w/ prolonged holds   Prayer Stretches x 12 reps - added to HEP   Scar Mass/Edema Control: Retrograde/Soft Tissue x To hand and wrist to reduce edema and increase soft tissue elasticity   Scar Manipulation x To reduce scar tissue and increase elasticity   Therapeutic Activity:     FM Tasks x -Opp pinching marbles to place into slot with wrist flexion  -Boading balls (clockwise and counter)  -Opp pinching with large pegs (in/out of board)  -card shuffling B hands for in hand manipulation        Strengthening:     Light putty x Gripping, rolling, pinching alternating fingers, peg removal   Isometric wrist extension x Wrist extension with hands on resistance from therapist   Forearm strengthening x -1# dumbbell, wrist extension, flexion   -yellow therabar-15 reps, supination/pronation   True balance x 3 mins   Other:     Silicone x Silicone gel pad issued to decrease rigidity    Putty x Issued for home     Assessment/Comments: Pt is making Fair + progress toward stated plan of care. Pt is progressing well and tolerated increased wrist strengthening activities this date. Strengthening increased this date to continue to increase strength and endurance in order to complete increased functional activities around the home and at work. Dumbbell 1# and yellow therabar added for forearm and wrist strengthening with good tolerance. Will increase as tolerated. -Rehab Potential: Good  -Requires OT Follow Up: Yes              Time In: 8:00 am            Time Out: 9:00 am     Treatment Charges: Mins Units   Modalities-paraffin 10 1   Ther Exercise 25 2   Manual Therapy 20 1   Thera Activities     ADL/Home Mgt      Neuro Re-education     Gait Training     Group Therapy     Non-Billable Service Time     Other     Total Time/Units 55 4     -Response to Treatment: She responded well to her treatment session. Pleased with how quickly she is progressing. Goal Progress: Goals for pt can be see on initial eval occurring on 3/29/2021.      Plan:   [x]  Continues Plan of care: Treatment covered based on POC and graduated to

## 2021-04-28 ENCOUNTER — TREATMENT (OUTPATIENT)
Dept: OCCUPATIONAL THERAPY | Age: 36
End: 2021-04-28
Payer: MEDICAID

## 2021-04-28 DIAGNOSIS — S52.572A OTHER CLOSED INTRA-ARTICULAR FRACTURE OF DISTAL END OF LEFT RADIUS, INITIAL ENCOUNTER: Primary | ICD-10-CM

## 2021-04-28 PROCEDURE — 97022 WHIRLPOOL THERAPY: CPT | Performed by: OCCUPATIONAL THERAPIST

## 2021-04-28 PROCEDURE — 97140 MANUAL THERAPY 1/> REGIONS: CPT | Performed by: OCCUPATIONAL THERAPIST

## 2021-04-28 PROCEDURE — 97110 THERAPEUTIC EXERCISES: CPT | Performed by: OCCUPATIONAL THERAPIST

## 2021-04-28 NOTE — PROGRESS NOTES
OCCUPATIONAL THERAPY PROGRESS NOTE    Date:  2021  Initial Evaluation Date: 2021              Evaluating Therapist: Yoel Chun     Patient Name:  Eden Harman               :  1985     Restrictions/Precautions:  Per Distal Radius ORIF Protocol (begin at 2 wk mary); NWB L UE; ROM edema control, scar management, desensitization, HEP, modalities PRN  Diagnosis:  L Distal Radius ORIF; S52.572A (ICD-10-CM) - Other closed intra-articular fracture of distal end of left radius, initial encounter  Insurance/Certification information: [address]  Referring Practitioner:  Lela Lo PA-C  Date of Surgery/Injury: 2021 sx  Plan of care signed (Y/N): Y  Certification: Thru 21  Visit# / total visits:     COVID-19 Screening completed upon entering facility and patient cleared for treatment today. Pt followed all protocols set forth by [facility] for Outpatient services throughout therapy session. Patient has been made aware of all new hygiene protocols due to COVID-19 set forth by [facility] Outpatient services and agrees to abide by all protocols. Pain Level: no pain     Subjective: Pt reports less pain & discomfort with her hardly noticing it. Objective:  Updated POC to be completed by 10th session. INTERVENTION: COMPLETED: SPECIFICS/COMMENTS:   Modality:     Fluidotherapy  x 15 min with AROM of the wrist/digits to reduce soft tissue stiffness and reduction of pain   Paraffin  10 min for soft tissue elasticity   MHP     AROM/AAROM:     L Wrist  x 10 reps ea AROM All Planes - reviewed for HEP  -Jux-A-Ciser 8 reps with elbow on table for wrist isolation.    Towel exercises-Hand scrunches to improve tendon gliding and reduce stiffness   Jomar George w/ Wrist  Roll ball forward and backward (flex/ext), side to side (RD/UD), and under/over (pro/sup) x 10   L Forearm x ( supination/pronation tasks)Using yellow pinch pin - pinch beads in pronation and supinate to place in cone  -picking up marbles with pronation and supination with manipulation to the finger tips to place into container.   -Turning over playing cards  -Scooping poms with cone in pronation and supinate to dumb into container   PROM/Stretching:     Red Web Stretches  10 reps wrist flex/et with 10 sec holds   Wrist PROM All Planes  Gentle w/ prolonged holds   Prayer Stretches x 15 reps - added to HEP   Scar Mass/Edema Control:     Retrograde/Soft Tissue x To hand and wrist to reduce edema and increase soft tissue elasticity   Scar Manipulation x To reduce scar tissue and increase elasticity  Cross friction with mini massager    Therapeutic Activity:     FM Tasks  -Opp pinching marbles to place into slot with wrist flexion  -Boading balls (clockwise and counter)  -Opp pinching with large pegs (in/out of board)  -card shuffling B hands for in hand manipulation        Strengthening:     Light putty  Gripping, rolling, pinching alternating fingers, peg removal   Isometric wrist extension x Wrist extension with hands on resistance from therapist  - Pressing ball with both hands and holding for 5 seconds x10. Wrist stability  x True balance 3 mins to improve wrist stability/control. Forearm strengthening x -1# dumbbell, wrist extension, flexion. RD/UD x10 each  -light resistance ( yellow) therabar-15 reps,   -supination/pronation, twisting x15 each direction. Other:     Silicone x Silicone gel pad issued to decrease rigidity    Putty x Issued for home     Assessment/Comments: Pt is making Fair + progress toward stated plan of care. Pt tolerated session well and demonstrated decreased pinching of her scar site after cross friction massage. Pt educated on completion at home to assist with increasing elasticity of the scar.  Pt to complete isometric exercises added today at home to increase strengthening of the forearm and wrist.       -Rehab Potential: Good  -Requires OT Follow Up:

## 2021-04-30 ENCOUNTER — TREATMENT (OUTPATIENT)
Dept: OCCUPATIONAL THERAPY | Age: 36
End: 2021-04-30
Payer: MEDICAID

## 2021-04-30 DIAGNOSIS — S52.572A OTHER CLOSED INTRA-ARTICULAR FRACTURE OF DISTAL END OF LEFT RADIUS, INITIAL ENCOUNTER: Primary | ICD-10-CM

## 2021-04-30 PROCEDURE — 97140 MANUAL THERAPY 1/> REGIONS: CPT | Performed by: OCCUPATIONAL THERAPIST

## 2021-04-30 PROCEDURE — 97018 PARAFFIN BATH THERAPY: CPT | Performed by: OCCUPATIONAL THERAPIST

## 2021-04-30 PROCEDURE — 97110 THERAPEUTIC EXERCISES: CPT | Performed by: OCCUPATIONAL THERAPIST

## 2021-04-30 NOTE — PROGRESS NOTES
supinate to place in cone  -picking up marbles with pronation and supination with manipulation to the finger tips to place into container.   -Turning over playing cards  -Scooping poms with cone in pronation and supinate to dumb into container   PROM/Stretching:     Red Web Stretches  10 reps wrist flex/et with 10 sec holds   Wrist PROM All Planes  Gentle w/ prolonged holds   Prayer Stretches x 15 reps - added to HEP, with extra extension/flexion at the end range. Scar Mass/Edema Control:     Retrograde/Soft Tissue x To hand and wrist to reduce edema and increase soft tissue elasticity   Scar Manipulation x To reduce scar tissue and increase elasticity  Cross friction with mini massager   - rolling therbar over scar site on table for decreasing scar tissue    Therapeutic Activity:     FM Tasks  -Opp pinching marbles to place into slot with wrist flexion  -Boading balls (clockwise and counter)  -Opp pinching with large pegs (in/out of board)  -card shuffling B hands for in hand manipulation        Strengthening:     Light putty  Gripping, rolling, pinching alternating fingers, peg removal   Isometric wrist extension x Wrist extension with hands on resistance from therapist  - Pressing ball with both hands and holding for 5 seconds x10. Wrist stability  x True balance 3 mins to improve wrist stability/control. Forearm strengthening x -1# dumbbell, wrist extension, flexion. RD/UD x10 each  -light resistance ( yellow) therabar-15 reps, supination, pronation, twisting x15 each direction. Other:     Silicone x Silicone gel pad issued to decrease rigidity    Putty x Issued for home     Assessment/Comments: Pt is making Fair + progress toward stated plan of care. Pt tolerated session well with increased  Focus on scar tissue management with good tolerance.  Pt educated on different scar tissue techniques with stretching to assist with decreasing scar tissue in the wrist.     -Rehab Potential: Good  -Requires OT Follow Up: Yes              Time In: 8:00 am            Time Out: 8:55 am     Treatment Charges: Mins Units   Modalities-paraffin 15 1   Ther Exercise 20 2   Manual Therapy 20 1   Thera Activities     ADL/Home Mgt      Neuro Re-education     Gait Training     Group Therapy     Non-Billable Service Time     Other     Total Time/Units 55 4     -Response to Treatment: She responded well to her treatment session. Pleased with how quickly she is progressing. Goal Progress: Goals for pt can be see on initial eval occurring on 3/29/2021. Plan:   [x]  Continues Plan of care: Treatment covered based on POC and graduated to patient's progress. Pt education continues at each visit to obtain maximum benefits from skilled OT intervention.   []  Alter Plan of care:   []  Discharge:    Kallie Nieves Mitchel 88, OTR/L #[id number]

## 2021-05-04 ENCOUNTER — TREATMENT (OUTPATIENT)
Dept: OCCUPATIONAL THERAPY | Age: 36
End: 2021-05-04
Payer: MEDICAID

## 2021-05-04 DIAGNOSIS — S52.572A OTHER CLOSED INTRA-ARTICULAR FRACTURE OF DISTAL END OF LEFT RADIUS, INITIAL ENCOUNTER: Primary | ICD-10-CM

## 2021-05-04 PROCEDURE — 97018 PARAFFIN BATH THERAPY: CPT | Performed by: OCCUPATIONAL THERAPIST

## 2021-05-04 PROCEDURE — 97140 MANUAL THERAPY 1/> REGIONS: CPT | Performed by: OCCUPATIONAL THERAPIST

## 2021-05-04 PROCEDURE — 97110 THERAPEUTIC EXERCISES: CPT | Performed by: OCCUPATIONAL THERAPIST

## 2021-05-04 NOTE — PROGRESS NOTES
OCCUPATIONAL THERAPY PROGRESS NOTE/RE-ASSESSMENT    Date:  2021  Initial Evaluation Date: 2021              Evaluating Therapist: Nemesio Young     Patient Name:  Sosa Love               :  1985     Restrictions/Precautions:  Per Distal Radius ORIF Protocol (begin at 2 wk mary); NWB L UE; ROM edema control, scar management, desensitization, HEP, modalities PRN  Diagnosis:  L Distal Radius ORIF; S52.572A (ICD-10-CM) - Other closed intra-articular fracture of distal end of left radius, initial encounter  Insurance/Certification information: [address]  Referring Practitioner:  Teresa López PA-C  Date of Surgery/Injury: 2021 sx  Plan of care signed (Y/N): Y  Certification: Thru 21  Visit# / total visits: 10 / 25     COVID-19 Screening completed upon entering facility and patient cleared for treatment today. Pt followed all protocols set forth by [facility] for Outpatient services throughout therapy session. Patient has been made aware of all new hygiene protocols due to COVID-19 set forth by [facility] Outpatient services and agrees to abide by all protocols. Pain Level: soreness 2/10     Subjective: Pt reports no new changes     Objective:  Updated POC to be completed by 10 th session. INTERVENTION: COMPLETED: SPECIFICS/COMMENTS:   Modality:     Fluidotherapy   15 min with AROM of the wrist/digits to reduce soft tissue stiffness and reduction of pain   Paraffin x 10 min for soft tissue elasticity with MHP and wrist in prolonged extension   MHP     AROM/AAROM:     L Wrist  x 10 reps ea AROM All Planes - reviewed for HEP  -Jux-A-Ciser 8 reps with elbow on table for wrist isolation.    Towel exercises-Hand scrunches to improve tendon gliding and reduce stiffness   Alisha Passer w/ Wrist  Roll ball forward and backward (flex/ext), side to side (RD/UD), and under/over (pro/sup) x 10   L Forearm  ( supination/pronation tasks)Using yellow pinch pin - pinch beads in pronation and supinate to place in cone  -picking up marbles with pronation and supination with manipulation to the finger tips to place into container.   -Turning over playing cards  -Scooping poms with cone in pronation and supinate to dumb into container   PROM/Stretching:     Red Web Stretches  10 reps wrist flex/et with 10 sec holds   Wrist PROM All Planes  Gentle w/ prolonged holds   Prayer Stretches  15 reps - added to HEP, with extra extension/flexion at the end range. Scar Mass/Edema Control:     Retrograde/Soft Tissue x To hand and wrist to reduce edema and increase soft tissue elasticity   Scar Manipulation x To reduce scar tissue and increase elasticity  Cross friction with mini massager   - rolling therbar over scar site on table for decreasing scar tissue    Therapeutic Activity:     FM Tasks  -Opp pinching marbles to place into slot with wrist flexion  -Boading balls (clockwise and counter)  -Opp pinching with large pegs (in/out of board)  -card shuffling B hands for in hand manipulation        Strengthening:     L hand/wrist x x10 each Gripping, rolling, pinching alternating fingers, peg removal of 16 pegs to improve pinch strength, wrist flexion, wrist extension, putty presses  - moderate resistance( red) clothes pins to increase pinch strength. Isometric wrist extension  Wrist extension with hands on resistance from therapist  - Pressing ball with both hands and holding for 5 seconds x10. Wrist stability   True balance 3 mins to improve wrist stability/control. Forearm strengthening x -1# dumbbell, wrist extension, flexion. RD/UD x10 each  -light resistance ( yellow) therabar-15 reps, supination, pronation, twisting x15 each direction. Other:     Silicone x Silicone gel pad issued to decrease rigidity    Putty x Issued for home     Assessment/Comments: Pt is making Fair + progress toward stated plan of care.  Pt tolerated session fair with increased soreness when completing strengthening with free weight during wrist extension. Completed more stretching and putty exercises to continue with strengthening without increased soreness.     -Rehab Potential: Good  -Requires OT Follow Up: Yes              Time In: 8:00 am            Time Out: 8:55 am     Treatment Charges: Mins Units   Modalities-paraffin 15 1   Ther Exercise 20 2   Manual Therapy 20 1   Thera Activities     ADL/Home Mgt      Neuro Re-education     Gait Training     Group Therapy     Non-Billable Service Time     Other     Total Time/Units 55 4     -Response to Treatment: She responded well to her treatment session. Pleased with how quickly she is progressing. Goal Progress: Goals for pt can be see on initial eval occurring on 3/29/2021. Plan:   [x]  Continues Plan of care: Treatment covered based on POC and graduated to patient's progress. Pt education continues at each visit to obtain maximum benefits from skilled OT intervention.   []  Alter Plan of care:   []  Discharge:    Mckayla Nieves Mitchel 88, OTR/L #[id number]

## 2021-05-06 ENCOUNTER — TREATMENT (OUTPATIENT)
Dept: OCCUPATIONAL THERAPY | Age: 36
End: 2021-05-06
Payer: MEDICAID

## 2021-05-06 DIAGNOSIS — S52.572A OTHER CLOSED INTRA-ARTICULAR FRACTURE OF DISTAL END OF LEFT RADIUS, INITIAL ENCOUNTER: Primary | ICD-10-CM

## 2021-05-06 PROCEDURE — 97110 THERAPEUTIC EXERCISES: CPT | Performed by: OCCUPATIONAL THERAPIST

## 2021-05-06 PROCEDURE — 97018 PARAFFIN BATH THERAPY: CPT | Performed by: OCCUPATIONAL THERAPIST

## 2021-05-06 PROCEDURE — 97140 MANUAL THERAPY 1/> REGIONS: CPT | Performed by: OCCUPATIONAL THERAPIST

## 2021-05-06 NOTE — PROGRESS NOTES
OCCUPATIONAL THERAPY PROGRESS NOTE/RE-ASSESSMENT    Date:  2021  Initial Evaluation Date: 2021              Evaluating Therapist: Patience Hay     Patient Name:  Erin Gallegos               :  1985     Restrictions/Precautions:  Per Distal Radius ORIF Protocol (begin at 2 wk mary); NWB L UE; ROM edema control, scar management, desensitization, HEP, modalities PRN  Diagnosis:  L Distal Radius ORIF; S52.572A (ICD-10-CM) - Other closed intra-articular fracture of distal end of left radius, initial encounter  Insurance/Certification information: [address]  Referring Practitioner:  Wilbert Rasheed PA-C  Date of Surgery/Injury: 2021 sx  Plan of care signed (Y/N): Y  Certification: Thru 21  Visit# / total visits:      COVID-19 Screening completed upon entering facility and patient cleared for treatment today. Pt followed all protocols set forth by [facility] for Outpatient services throughout therapy session. Patient has been made aware of all new hygiene protocols due to COVID-19 set forth by [facility] Outpatient services and agrees to abide by all protocols. Pain Level: soreness /10     Subjective: Pt reports her scar is more irritated and continued pain down Ulnar side of her wrist.      Objective:  Updated POC to be completed by 10 th session. INTERVENTION: COMPLETED: SPECIFICS/COMMENTS:   Modality:     Fluidotherapy  x 15 min with AROM of the wrist/digits to reduce soft tissue stiffness and reduction of pain at the end of the session. Paraffin x 10 min for soft tissue elasticity with MHP and wrist in prolonged extension   MHP     AROM/AAROM:     L Wrist  x 10 reps ea AROM All Planes - reviewed for HEP  -Jux-A-Ciser 8 reps with elbow on table for wrist isolation.    Towel exercises-Hand scrunches to improve tendon gliding and reduce stiffness   Marcela Quivers w/ Wrist  Roll ball forward and backward (flex/ext), side to side (RD/UD), and under/over (pro/sup) x 10   L Forearm  ( supination/pronation tasks)Using yellow pinch pin - pinch beads in pronation and supinate to place in cone  -picking up marbles with pronation and supination with manipulation to the finger tips to place into container.   -Turning over playing cards  -Scooping poms with cone in pronation and supinate to dumb into container   PROM/Stretching:     Red Web Stretches x 10 reps wrist flex/et with 10 sec holds   Wrist PROM All Planes  Gentle w/ prolonged holds   Prayer Stretches x 15 reps - added to HEP, with extra extension/flexion at the end range. Scar Mass/Edema Control:     Retrograde/Soft Tissue x To hand and wrist to reduce edema and increase soft tissue elasticity   Scar Manipulation  To reduce scar tissue and increase elasticity  Cross friction with mini massager   - rolling therbar over scar site on table for decreasing scar tissue    Therapeutic Activity:     FM Tasks  -Opp pinching marbles to place into slot with wrist flexion  -Boading balls (clockwise and counter)  -Opp pinching with large pegs (in/out of board)  -card shuffling B hands for in hand manipulation        Strengthening:     L hand/wrist  x10 each Gripping, rolling, pinching alternating fingers, peg removal of 16 pegs to improve pinch strength, wrist flexion, wrist extension, putty presses  - moderate resistance( red) clothes pins to increase pinch strength. Isometric wrist extension  Wrist extension with hands on resistance from therapist  - Pressing ball with both hands and holding for 5 seconds x10. Wrist stability  x True balance 4 mins to improve wrist stability/control. Forearm strengthening  -1# dumbbell, wrist extension, flexion. RD/UD x10 each  -light resistance ( yellow) therabar-15 reps, supination, pronation, twisting x15 each direction.     Other:     Silicone x Silicone gel pad issued to decrease rigidity    Putty x Issued for home     Assessment/Comments: Pt is making Fair + progress toward stated plan of care. Pt tolerated session fair however continues to demonstrate soreness on the ulnar side with pulling reported. Deferred strengthening exercises to calm possible muscle strain and education to wear brace at night while sleeping. Pt was re-assessed and demonstrate slight improvement with ROM in the wrist however continued deficits in her  and pinch strength. Pt continues to demonstrate scar tissue around top portion of the scar the wrist and slight continued edema. Continue with OT POC to work towards meeting all established goals. Certification period updated.        Left Upper Extremity ROM  /  KEY: Ext/Flex     AROM(PROM)              05/06/21  Forearm Pronation: 0/76-84* WFLs         Supination: 0/80* 0/52* 0/68*   0/85*      Wrist Flexion: 0/60-80* 0/43*   0/68*   0/63*     Extension: 0/60-75* 0/40*  0/65* 0/72*      Radial Deviation: 0/20-25* 0/15*   0/20*   0/23*     Ulnar Deviation: 0/30-45* 0/28*  0/40*  0/45*     Dynamometer (setting 2): Dr virgen for light strengthening on 4/13/2021                                Left: 46#, 45#                                              Right: 71#                  Pinch Meter:              Lateral: Left= 15#, 15#  Right= 17#               Palmar 3 point: Left= 11#,10#   Right= 15#    Edema Description/Circumferential Measurements:              Wrist Distal to Ulnar Styloid: 16.9 cm R   &   18 cm L, 18 cm    QuickDASH Score: 59.1% disability    -Rehab Potential: Good  -Requires OT Follow Up: Yes              Time In: 8:00 am            Time Out: 8:55 am     Treatment Charges: Mins Units   Modalities-paraffin 10 1   Ther Exercise 25 2   Manual Therapy 25 1   Thera Activities     ADL/Home Mgt      Neuro Re-education     Gait Training     Group Therapy     Non-Billable Service Time     Other     Total Time/Units 60 4     -Response to Treatment: She responded well to her treatment session.  Pleased with how quickly she is progressing. Goal Progress:   GOALS (Long term same as Short term):  1) Patient will demonstrate good understanding of home program (exercises/activities/diagnosis/prognosis/goals) with good accuracy. Progressing  2) Patient will demonstrate increased active/passive range of motion of their L wrist to [facility] for ADL/IADL completion. Progressing  3) Patient will demonstrate increased /pinch strength of at least 10 / 2-5 pinch pounds of their L hand when appropriate. Progressing  4) Patient to report 100% compliance with their splint wear, care, and precautions if needed. Discharged. To use at night/during the day as needed however not using currently  5) Patient to demonstrate decreased guarding of their affected extremity from 100% to 20% or less. Met. Pt reports using her LUE daily at work. 6) Patient will be knowledgeable of edema control techniques as evident with decreases from moderate to mild/none. Progressing  7) Patient will demonstrate a non-tender/non-adherent scar. Progressing  8) Patient will report ADL functions as Mod I/I using L UE. Progressing  9) Patient will decrease QuickDASH score to 20% or less for increased participation in daily functional activities. Progressing    Plan:   [x]  Continues Plan of care: Frequency/Duration 2-3x / week for 62 WENOUK.   Certification period From: 3/29/2021  To: 7/21/2021  Treatment covered based on POC and graduated to patient's progress. Pt education continues at each visit to obtain maximum benefits from skilled OT intervention.   []  Alter Plan of care:   []  Discharge:    Paulina Nieves Mitchel 88, OTR/L #[id number]

## 2021-05-11 ENCOUNTER — TREATMENT (OUTPATIENT)
Dept: OCCUPATIONAL THERAPY | Age: 36
End: 2021-05-11
Payer: MEDICAID

## 2021-05-11 DIAGNOSIS — S52.572A OTHER CLOSED INTRA-ARTICULAR FRACTURE OF DISTAL END OF LEFT RADIUS, INITIAL ENCOUNTER: Primary | ICD-10-CM

## 2021-05-11 PROCEDURE — 97110 THERAPEUTIC EXERCISES: CPT | Performed by: OCCUPATIONAL THERAPIST

## 2021-05-11 PROCEDURE — 97140 MANUAL THERAPY 1/> REGIONS: CPT | Performed by: OCCUPATIONAL THERAPIST

## 2021-05-11 PROCEDURE — 97018 PARAFFIN BATH THERAPY: CPT | Performed by: OCCUPATIONAL THERAPIST

## 2021-05-11 NOTE — PROGRESS NOTES
OCCUPATIONAL THERAPY PROGRESS NOTE    Date:  2021  Initial Evaluation Date: 2021              Evaluating Therapist: Chelsi Ariza     Patient Name:  Prerna Gutierrez               :  1985     Restrictions/Precautions:  Per Distal Radius ORIF Protocol (begin at 2 wk mary); NWB L UE; ROM edema control, scar management, desensitization, HEP, modalities PRN  Diagnosis:  L Distal Radius ORIF; S52.572A (ICD-10-CM) - Other closed intra-articular fracture of distal end of left radius, initial encounter  Insurance/Certification information: [address],[facility]  Referring Practitioner:  Tejal Day PA-C  Date of Surgery/Injury: 2021 sx  Plan of care signed (Y/N): Y  Certification: Thru 21  Visit# / total visits:      COVID-19 Screening completed upon entering facility and patient cleared for treatment today. Pt followed all protocols set forth by [facility] for Outpatient services throughout therapy session. Patient has been made aware of all new hygiene protocols due to COVID-19 set forth by [facility] Outpatient services and agrees to abide by all protocols. Pain Level: no pain      Subjective: Pt reports she is no longer have discomfort or pain. Objective:  Updated POC to be completed by 10  session. INTERVENTION: COMPLETED: SPECIFICS/COMMENTS:   Modality:     Fluidotherapy   15 min with AROM of the wrist/digits to reduce soft tissue stiffness and reduction of pain at the end of the session. Paraffin x 10 min for soft tissue elasticity with MHP and wrist in prolonged extension   MHP     AROM/AAROM:     L Wrist  x 10 reps ea AROM All Planes - reviewed for HEP  -Jux-A-Ciser 8 reps with elbow on table for wrist isolation.    Towel exercises-Hand scrunches to improve tendon gliding and reduce stiffness   Ramiro Chucho w/ Wrist  Roll ball forward and backward (flex/ext), side to side (RD/UD), and under/over (pro/sup) x 10   L Forearm  ( supination/pronation tasks)Using yellow pinch pin - pinch beads in pronation and supinate to place in cone  -picking up marbles with pronation and supination with manipulation to the finger tips to place into container.   -Turning over playing cards  -Scooping poms with cone in pronation and supinate to dumb into container   PROM/Stretching:     Red Web Stretches x 10 reps wrist flex/et with 10 sec holds   Wrist PROM All Planes  Gentle w/ prolonged holds   Prayer Stretches  15 reps - added to HEP, with extra extension/flexion at the end range. Scar Mass/Edema Control:     Retrograde/Soft Tissue x To hand and wrist to reduce edema and increase soft tissue elasticity   Scar Manipulation x To reduce scar tissue and increase elasticity  Cross friction with mini massager   - rolling therbar over scar site on table for decreasing scar tissue    Therapeutic Activity:     FM Tasks  -Opp pinching marbles to place into slot with wrist flexion  -Boading balls (clockwise and counter)  -Opp pinching with large pegs (in/out of board)  -card shuffling B hands for in hand manipulation        Strengthening:     L hand/wrist x x10 each Gripping, rolling, pinching alternating fingers, pulling, peg removal of 16 pegs with tweezers to improve pinch strength, wrist flexion, wrist extension, putty presses  3.0# Digi-Flex: 2 sets x15 sup, pron, and lateral.   - Mr. Katiana Duarte ( up & down =1) with 1# free weight x4  - moderate resistance( red) clothes pins to increase pinch strength. Isometric wrist extension  Wrist extension with hands on resistance from therapist  - Pressing ball with both hands and holding for 5 seconds x10. Wrist stability  x True balance 4 mins to improve wrist stability/control. Forearm strengthening x -1# dumbbell, wrist extension, flexion. RD/UD x10 each  -light resistance ( yellow) therabar-15 reps, supination, pronation, twisting x15 each direction.     Other:     Silicone x Silicone gel pad issued to decrease rigidity    Putty x Issued for home     Assessment/Comments: Pt is making Fair + progress toward stated plan of care. Pt tolerated session well with increased tolerance to strengthening without pain. Pt reports wearing her brace again at night and during the day when needed. Pt was able to tolerate increased strengthening and stretching. Will continue to improve strength as pt tolerates as well as decrease scar tissue in the flexor retinaculum.     -Rehab Potential: Good  -Requires OT Follow Up: Yes              Time In: 8:00 am            Time Out: 9:00 am     Treatment Charges: Mins Units   Modalities-paraffin 10 1   Ther Exercise 30 2   Manual Therapy 20 1   Thera Activities     ADL/Home Mgt      Neuro Re-education     Gait Training     Group Therapy     Non-Billable Service Time     Other     Total Time/Units 60 4     -Response to Treatment: She responded well to her treatment session. Pleased with how quickly she is progressing. Goal Progress:   GOALS (Long term same as Short term):  1) Patient will demonstrate good understanding of home program (exercises/activities/diagnosis/prognosis/goals) with good accuracy. Progressing  2) Patient will demonstrate increased active/passive range of motion of their L wrist to [facility] for ADL/IADL completion. Progressing  3) Patient will demonstrate increased /pinch strength of at least 10 / 2-5 pinch pounds of their L hand when appropriate. Progressing  4) Patient to report 100% compliance with their splint wear, care, and precautions if needed. Discharged. To use at night/during the day as needed however not using currently  5) Patient to demonstrate decreased guarding of their affected extremity from 100% to 20% or less. Met. Pt reports using her LUE daily at work. 6) Patient will be knowledgeable of edema control techniques as evident with decreases from moderate to mild/none.     Progressing  7) Patient will demonstrate a non-tender/non-adherent scar. Progressing  8) Patient will report ADL functions as Mod I/I using L UE. Progressing  9) Patient will decrease QuickDASH score to 20% or less for increased participation in daily functional activities. Progressing    Plan:   [x]  Continues Plan of care: Frequency/Duration 2-3x / week for 24 RTQAOQ.   Certification period From: 3/29/2021  To: 7/21/2021  Treatment covered based on POC and graduated to patient's progress. Pt education continues at each visit to obtain maximum benefits from skilled OT intervention.   []  Alter Plan of care:   []  Discharge:    Kaden Nieves Mitchel 88, OTR/L #[id number]

## 2021-05-13 ENCOUNTER — TREATMENT (OUTPATIENT)
Dept: OCCUPATIONAL THERAPY | Age: 36
End: 2021-05-13
Payer: MEDICAID

## 2021-05-13 DIAGNOSIS — S52.572A OTHER CLOSED INTRA-ARTICULAR FRACTURE OF DISTAL END OF LEFT RADIUS, INITIAL ENCOUNTER: Primary | ICD-10-CM

## 2021-05-13 PROCEDURE — 97110 THERAPEUTIC EXERCISES: CPT | Performed by: OCCUPATIONAL THERAPIST

## 2021-05-13 PROCEDURE — 97018 PARAFFIN BATH THERAPY: CPT | Performed by: OCCUPATIONAL THERAPIST

## 2021-05-13 PROCEDURE — 97140 MANUAL THERAPY 1/> REGIONS: CPT | Performed by: OCCUPATIONAL THERAPIST

## 2021-05-13 NOTE — PROGRESS NOTES
OCCUPATIONAL THERAPY PROGRESS NOTE    Date:  2021  Initial Evaluation Date: 2021              Evaluating Therapist: Grisel Rodriguez     Patient Name:  Rosa Pillai               :  1985     Restrictions/Precautions:  Per Distal Radius ORIF Protocol (begin at 2 wk mary); NWB L UE; ROM edema control, scar management, desensitization, HEP, modalities PRN  Diagnosis:  L Distal Radius ORIF; S52.572A (ICD-10-CM) - Other closed intra-articular fracture of distal end of left radius, initial encounter  Insurance/Certification information: [address]  Referring Practitioner:  Simran Rosales PA-C  Date of Surgery/Injury: 2021 sx  Plan of care signed (Y/N): Y  Certification: Thru 21  Visit# / total visits:      COVID-19 Screening completed upon entering facility and patient cleared for treatment today. Pt followed all protocols set forth by [facility] for Outpatient services throughout therapy session. Patient has been made aware of all new hygiene protocols due to COVID-19 set forth by [facility] Outpatient services and agrees to abide by all protocols. Pain Level: no pain reported    Subjective: Pt reports less discomfort and decreasing pain throughout the day. Objective:  Updated POC to be completed by 10  session. INTERVENTION: COMPLETED: SPECIFICS/COMMENTS:   Modality:     Fluidotherapy   15 min with AROM of the wrist/digits to reduce soft tissue stiffness and reduction of pain at the end of the session. Paraffin x 10 min for soft tissue elasticity with MHP and wrist in prolonged extension   MHP     AROM/AAROM:     L Wrist  x 10 reps ea AROM All Planes - reviewed for HEP  -Jux-A-Ciser 8 reps with elbow on table for wrist isolation.    Towel exercises-Hand scrunches to improve tendon gliding and reduce stiffness ( RD/UD)   Ryan Romeliaogna w/ Wrist  Roll ball forward and backward (flex/ext), side to side (RD/UD), and under/over extension, flexion. RD/UD x10 each  -moderate resistance (red ) therabar-15 reps, supination, pronation, twisting x15 each direction. Other:     Silicone x Silicone gel pad issued to decrease rigidity    Putty x Issued for home     Assessment/Comments: Pt is making Fair + progress toward stated plan of care. Pt tolerated session well with increased tolerance for strengthening. Pt had increased with free weight and resistance without pain or soreness reported. Will continue to work on increased strengthening adding functional activities with weight to simulate her job as a . -Rehab Potential: Good  -Requires OT Follow Up: Yes              Time In: 8:00 am            Time Out: 9:00 am     Treatment Charges: Mins Units   Modalities-paraffin 10 1   Ther Exercise 30 2   Manual Therapy 20 1   Thera Activities     ADL/Home Mgt      Neuro Re-education     Gait Training     Group Therapy     Non-Billable Service Time     Other     Total Time/Units 60 4     -Response to Treatment: She responded well to her treatment session. Pleased with how quickly she is progressing. Goal Progress:   GOALS (Long term same as Short term):  1) Patient will demonstrate good understanding of home program (exercises/activities/diagnosis/prognosis/goals) with good accuracy. Progressing  2) Patient will demonstrate increased active/passive range of motion of their L wrist to [facility] for ADL/IADL completion. Progressing  3) Patient will demonstrate increased /pinch strength of at least 10 / 2-5 pinch pounds of their L hand when appropriate. Progressing  4) Patient to report 100% compliance with their splint wear, care, and precautions if needed. Discharged. To use at night/during the day as needed however not using currently  5) Patient to demonstrate decreased guarding of their affected extremity from 100% to 20% or less. Met. Pt reports using her LUE daily at work.    6) Patient will be knowledgeable of edema control techniques as evident with decreases from moderate to mild/none. Progressing  7) Patient will demonstrate a non-tender/non-adherent scar. Progressing  8) Patient will report ADL functions as Mod I/I using L UE. Progressing  9) Patient will decrease QuickDASH score to 20% or less for increased participation in daily functional activities. Progressing    Plan:   [x]  Continues Plan of care: Frequency/Duration 2-3x / week for 41 SLEUES.   Certification period From: 3/29/2021  To: 7/21/2021  Treatment covered based on POC and graduated to patient's progress. Pt education continues at each visit to obtain maximum benefits from skilled OT intervention.   []  Alter Plan of care:   []  Discharge:    Emil Nieves Mitchel 88, OTR/L #[id number]

## 2021-05-18 ENCOUNTER — TREATMENT (OUTPATIENT)
Dept: OCCUPATIONAL THERAPY | Age: 36
End: 2021-05-18
Payer: MEDICAID

## 2021-05-18 DIAGNOSIS — S52.572A OTHER CLOSED INTRA-ARTICULAR FRACTURE OF DISTAL END OF LEFT RADIUS, INITIAL ENCOUNTER: Primary | ICD-10-CM

## 2021-05-18 PROCEDURE — 97140 MANUAL THERAPY 1/> REGIONS: CPT | Performed by: OCCUPATIONAL THERAPIST

## 2021-05-18 PROCEDURE — 97110 THERAPEUTIC EXERCISES: CPT | Performed by: OCCUPATIONAL THERAPIST

## 2021-05-18 PROCEDURE — 97018 PARAFFIN BATH THERAPY: CPT | Performed by: OCCUPATIONAL THERAPIST

## 2021-05-20 ENCOUNTER — TREATMENT (OUTPATIENT)
Dept: OCCUPATIONAL THERAPY | Age: 36
End: 2021-05-20
Payer: MEDICAID

## 2021-05-20 DIAGNOSIS — S52.572A OTHER CLOSED INTRA-ARTICULAR FRACTURE OF DISTAL END OF LEFT RADIUS, INITIAL ENCOUNTER: Primary | ICD-10-CM

## 2021-05-20 PROCEDURE — 97018 PARAFFIN BATH THERAPY: CPT | Performed by: OCCUPATIONAL THERAPIST

## 2021-05-20 PROCEDURE — 97140 MANUAL THERAPY 1/> REGIONS: CPT | Performed by: OCCUPATIONAL THERAPIST

## 2021-05-20 PROCEDURE — 97110 THERAPEUTIC EXERCISES: CPT | Performed by: OCCUPATIONAL THERAPIST

## 2021-05-20 NOTE — PROGRESS NOTES
OCCUPATIONAL THERAPY PROGRESS NOTE    Date:  2021  Initial Evaluation Date: 2021              Evaluating Therapist: Jared Monroy     Patient Name:  Kareen Baker               :  1985     Restrictions/Precautions:  Per Distal Radius ORIF Protocol (begin at 2 wk mary); NWB L UE; ROM edema control, scar management, desensitization, HEP, modalities PRN  Diagnosis:  L Distal Radius ORIF; S52.572A (ICD-10-CM) - Other closed intra-articular fracture of distal end of left radius, initial encounter  Insurance/Certification information: [address]  Referring Practitioner:  Jasvir Mckinnon PA-C  Date of Surgery/Injury: 2021 sx  Plan of care signed (Y/N): Y  Certification: Thru 21  Visit# / total visits:      COVID-19 Screening completed upon entering facility and patient cleared for treatment today. Pt followed all protocols set forth by [facility] for Outpatient services throughout therapy session. Patient has been made aware of all new hygiene protocols due to COVID-19 set forth by [facility] Outpatient services and agrees to abide by all protocols. Pain Level: no new changes    Subjective: Pt reports slight discomfort on the ulnar side but not all the time. Objective:  Updated POC to be completed by 10  session. INTERVENTION: COMPLETED: SPECIFICS/COMMENTS:   Modality:     Fluidotherapy   15 min with AROM of the wrist/digits to reduce soft tissue stiffness and reduction of pain at the end of the session. Paraffin x 10 min for soft tissue elasticity with MHP and wrist in prolonged extension   MHP     AROM/AAROM:     L Wrist  x 10 reps ea AROM All Planes - reviewed for HEP  -Jux-A-Ciser 8 reps with elbow on table for wrist isolation.    Towel exercises-Hand scrunches to improve tendon gliding and reduce stiffness ( RD/UD)   Duard Rhein w/ Wrist  Roll ball forward and backward (flex/ext), side to side (RD/UD), and under/over (pro/sup) x 10   L Forearm  ( supination/pronation tasks)Using yellow pinch pin - pinch beads in pronation and supinate to place in cone  -picking up marbles with pronation and supination with manipulation to the finger tips to place into container.   -Turning over playing cards  -Scooping poms with cone in pronation and supinate to dumb into container   PROM/Stretching:     Red Web Stretches  10 reps wrist flex/et with 10 sec holds   Wrist PROM All Planes x Gentle w/ prolonged holds   Prayer Stretches x 10 reps - also  HEP, with extra extension/flexion at the end range. Scar Mass/Edema Control:     Retrograde/Soft Tissue x To hand and wrist to reduce edema and increase soft tissue elasticity   Scar Manipulation x To reduce scar tissue and increase elasticity  Cross friction with mini massager   - rolling therbar over scar site on table for decreasing scar tissue    Therapeutic Activity:     FM Tasks x -Opp pinching marbles- to day small beads into med putty for pinch strengthening  -Boading balls (clockwise and counter)  -Opp pinching with large pegs (in/out of board)  -card shuffling B hands for in hand manipulation        Strengthening:     L hand/wrist x x10 each Gripping, rolling, pinching alternating fingers, pulling, peg removal of 16 pegs with tweezers to improve pinch strength, wrist flexion, wrist extension, putty presses  3.0# Digi-Flex: ^ to 3 sets x15 sup, pron, and lateral.   - Mr. Lonnie Pompa ( up & down =1) with 2# free weight x4  - 1# ball throwing and catching in LUE 2 sets ^ to 20 rep's and added throw in supination and rotate and catch in pronation 20 rep's .   - moderate resistance( red) clothes pins to increase pinch strength. - easy putty - lg tools 2 sets for about 4 min each - knob and digging  Tool. jennifer well.     Isometric wrist extension  Wrist extension with hands on resistance from therapist  - Pressing ball with both hands and holding for 5 seconds 2 sets x10, pressing against table 2 set x10, 2 sets x10 pressing against the wall. Wrist stability   True balance 4 mins to improve wrist stability/control. Forearm strengthening x - 2 # dumbbell, wrist extension, flexion. RD/UD, pronation/supination ^ to 20 rep's  each  -moderate resistance (red- 10# ) therabar-15 reps, supination, pronation, twisting x15 each direction. Other:     Silicone x Silicone gel pad issued to decrease rigidity    Putty x Issued for home     Assessment/Comments: Pt is making Fair + progress toward stated plan of care. Pt tolerated session well with increased strengthening completed with no increased pain. Pt did report slight ulnar pain minimally and only with certain activities and not continuous. Pt continues to report increased ability to complete tasks with no pain.     -Rehab Potential: Good  -Requires OT Follow Up: Yes              Time In: 8:00 am            Time Out: 9:00 am     Treatment Charges: Mins Units   Modalities-paraffin 10 1   Ther Exercise 30 2   Manual Therapy 20 1   Thera Activities     ADL/Home Mgt      Neuro Re-education     Gait Training     Group Therapy     Non-Billable Service Time     Other     Total Time/Units 60 4     -Response to Treatment: She responded well to her treatment session. Pleased with how quickly she is progressing. Goal Progress:   GOALS (Long term same as Short term):  1) Patient will demonstrate good understanding of home program (exercises/activities/diagnosis/prognosis/goals) with good accuracy. Progressing  2) Patient will demonstrate increased active/passive range of motion of their L wrist to [facility] for ADL/IADL completion. Progressing  3) Patient will demonstrate increased /pinch strength of at least 10 / 2-5 pinch pounds of their L hand when appropriate. Progressing  4) Patient to report 100% compliance with their splint wear, care, and precautions if needed. Discharged.  To use at night/during the day as needed however not using currently  5) Patient to demonstrate decreased guarding of their affected extremity from 100% to 20% or less. Met. Pt reports using her LUE daily at work. 6) Patient will be knowledgeable of edema control techniques as evident with decreases from moderate to mild/none. Progressing  7) Patient will demonstrate a non-tender/non-adherent scar. Progressing  8) Patient will report ADL functions as Mod I/I using L UE. Progressing  9) Patient will decrease QuickDASH score to 20% or less for increased participation in daily functional activities. Progressing    Plan:   [x]  Continues Plan of care: Frequency/Duration 2-3x / week for 23 EWMEMM.   Certification period From: 3/29/2021  To: 7/21/2021  Treatment covered based on POC and graduated to patient's progress. Pt education continues at each visit to obtain maximum benefits from skilled OT intervention.   []  Alter Plan of care:   []  Discharge:    Manjeet Luis, OT /L, CHT

## 2021-05-25 ENCOUNTER — TREATMENT (OUTPATIENT)
Dept: OCCUPATIONAL THERAPY | Age: 36
End: 2021-05-25
Payer: MEDICAID

## 2021-05-25 DIAGNOSIS — S52.572A OTHER CLOSED INTRA-ARTICULAR FRACTURE OF DISTAL END OF LEFT RADIUS, INITIAL ENCOUNTER: Primary | ICD-10-CM

## 2021-05-25 PROCEDURE — 97018 PARAFFIN BATH THERAPY: CPT | Performed by: OCCUPATIONAL THERAPIST

## 2021-05-25 PROCEDURE — 97140 MANUAL THERAPY 1/> REGIONS: CPT | Performed by: OCCUPATIONAL THERAPIST

## 2021-05-25 PROCEDURE — 97110 THERAPEUTIC EXERCISES: CPT | Performed by: OCCUPATIONAL THERAPIST

## 2021-05-25 NOTE — PROGRESS NOTES
OCCUPATIONAL THERAPY PROGRESS NOTE    Date:  2021  Initial Evaluation Date: 2021              Evaluating Therapist: Nemesio Young     Patient Name:  Sosa Love               :  1985     Restrictions/Precautions:  Per Distal Radius ORIF Protocol (begin at 2 wk mary); NWB L UE; ROM edema control, scar management, desensitization, HEP, modalities PRN  Diagnosis:  L Distal Radius ORIF; S52.572A (ICD-10-CM) - Other closed intra-articular fracture of distal end of left radius, initial encounter  Insurance/Certification information: [address]  Referring Practitioner:  Teresa López PA-C  Date of Surgery/Injury: 2021 sx  Plan of care signed (Y/N): Y  Certification: Thru 21  Visit# / total visits:      COVID-19 Screening completed upon entering facility and patient cleared for treatment today. Pt followed all protocols set forth by [facility] for Outpatient services throughout therapy session. Patient has been made aware of all new hygiene protocols due to COVID-19 set forth by [facility] Outpatient services and agrees to abide by all protocols. Pain Level: no new changes    Subjective: Pt reports \"I did some weights this weekend - I did less wt - like 8 #'s but I was able to do it. \"     Objective:  Updated POC to be completed by last session. INTERVENTION: COMPLETED: SPECIFICS/COMMENTS:   Modality:     Fluidotherapy   15 min with AROM of the wrist/digits to reduce soft tissue stiffness and reduction of pain at the end of the session. Paraffin x 10 min for soft tissue elasticity with MHP and wrist in prolonged extension   MHP     AROM/AAROM:     L Wrist   10 reps ea AROM All Planes - reviewed for HEP  -Jux-A-Ciser 8 reps with elbow on table for wrist isolation.    Towel exercises-Hand scrunches to improve tendon gliding and reduce stiffness ( RD/UD)   Alisha Passer w/ Wrist  Roll ball forward and backward (flex/ext), side to side (RD/UD), and under/over (pro/sup) x 10   L Forearm  ( supination/pronation tasks)Using yellow pinch pin - pinch beads in pronation and supinate to place in cone  -picking up marbles with pronation and supination with manipulation to the finger tips to place into container.   -Turning over playing cards  -Scooping poms with cone in pronation and supinate to dumb into container   PROM/Stretching:     Red Web Stretches  10 reps wrist flex/et with 10 sec holds   Wrist PROM All Planes x Gentle w/ prolonged holds   Prayer Stretches  10 reps - also  HEP, with extra extension/flexion at the end range. Scar Mass/Edema Control:     Retrograde/Soft Tissue x To hand and wrist to reduce edema and increase soft tissue elasticity   Scar Manipulation x To reduce scar tissue and increase elasticity  Cross friction with mini massager   - rolling therbar over scar site on table for decreasing scar tissue    Therapeutic Activity:     FM Tasks x -Opp pinching marbles- to day small beads into med putty for pinch strengthening  -Boading balls (clockwise and counter)  -Opp pinching with large pegs (in/out of board)  -card shuffling B hands for in hand manipulation        Strengthening:     L hand/wrist x ^ to med orange putty  Gripping- 4 min, rolling, pinching alternating fingers, remove sm beads with lateral pinch, peg removal of 16 pegs with tweezers to improve pinch strength, wrist flexion, wrist extension, putty presses  ^ to 5.0# Digi-Flex: ^ to 3 sets x15 sup, pron, and lateral.   - Mr. Lana Dumont ( up & down =1) with 2# free weight x4  - 1# ball throwing and catching in LUE 2 sets ^ to 20 rep's and added throw in supination and rotate and catch in pronation 20 rep's .   - ^;ed to mod +  resistance( red -used one set of green and one blue today ) clothes pins to increase pinch strength- 25 lateral and 25 3 -poiont. - easy putty - lg tools 2 sets for about 4 min each - knob and digging  Tool. jennifer well.     Isometric wrist extension  Wrist extension with hands on resistance from therapist  - Pressing ball with both hands and holding for 5 seconds 2 sets x10, pressing against table 2 set x10, 2 sets x10 pressing against the wall. Wrist stability   True balance 4 mins to improve wrist stability/control. Forearm strengthening x - ^ to 3 # dumbbell, wrist extension, flexion. RD/UD, pronation/supination ^ to 20 rep's  each  - ^'ed  resistance (red- 10# - to green 15 #) therabar-15 reps, supination, pronation, twisting x15 each direction. Other:     Silicone x Silicone gel pad issued to decrease rigidity    Putty x Issued for home- given sm piece of orange to add to her yellow ( 3/4 yellow to 1/4 orange)to make it harder. Assessment/Comments: Pt is making Fair + progress toward stated plan of care. Pt tolerated session well with increased strengthening completed with no increased pain. Pt continues to report increased ability to complete tasks with no pain.     -Rehab Potential: Good  -Requires OT Follow Up: Yes              Time In: 8:00 am            Time Out: 9:00 am     Treatment Charges: Mins Units   Modalities-paraffin 10 1   Ther Exercise 30 2   Manual Therapy 20 1   Thera Activities     ADL/Home Mgt      Neuro Re-education     Gait Training     Group Therapy     Non-Billable Service Time     Other     Total Time/Units 60 4     -Response to Treatment: She responded well to her treatment session. Pleased with how quickly she is progressing. Pt feels she is ready for D/C in 2 sessions. Goal Progress:   GOALS (Long term same as Short term):  1) Patient will demonstrate good understanding of home program (exercises/activities/diagnosis/prognosis/goals) with good accuracy. Progressing  2) Patient will demonstrate increased active/passive range of motion of their L wrist to [facility] for ADL/IADL completion.     Progressing  3) Patient will demonstrate increased /pinch strength of at least 10 / 2-5 pinch pounds of their L hand when appropriate. Progressing  4) Patient to report 100% compliance with their splint wear, care, and precautions if needed. Discharged. To use at night/during the day as needed however not using currently  5) Patient to demonstrate decreased guarding of their affected extremity from 100% to 20% or less. Met. Pt reports using her LUE daily at work. 6) Patient will be knowledgeable of edema control techniques as evident with decreases from moderate to mild/none. Progressing  7) Patient will demonstrate a non-tender/non-adherent scar. Progressing  8) Patient will report ADL functions as Mod I/I using L UE. Progressing  9) Patient will decrease QuickDASH score to 20% or less for increased participation in daily functional activities. Progressing    Plan:   [x]  Continues Plan of care: Frequency/Duration 2-3x / week for 08 SXMPEM.   Certification period From: 3/29/2021  To: 7/21/2021  Treatment covered based on POC and graduated to patient's progress. Pt education continues at each visit to obtain maximum benefits from skilled OT intervention.   []  Alter Plan of care:   []  Discharge:    Adriana Ochoa, OT /L, CHT

## 2021-05-27 ENCOUNTER — OFFICE VISIT (OUTPATIENT)
Dept: ORTHOPEDIC SURGERY | Age: 36
End: 2021-05-27
Payer: MEDICAID

## 2021-05-27 ENCOUNTER — HOSPITAL ENCOUNTER (OUTPATIENT)
Dept: GENERAL RADIOLOGY | Age: 36
Discharge: HOME | End: 2021-05-29
Payer: MEDICAID

## 2021-05-27 ENCOUNTER — TREATMENT (OUTPATIENT)
Dept: OCCUPATIONAL THERAPY | Age: 36
End: 2021-05-27
Payer: MEDICAID

## 2021-05-27 VITALS — WEIGHT: 240 LBS | BODY MASS INDEX: 42.52 KG/M2 | HEIGHT: 63 IN

## 2021-05-27 DIAGNOSIS — S52.532D CLOSED COLLES' FRACTURE OF LEFT RADIUS WITH ROUTINE HEALING, SUBSEQUENT ENCOUNTER: Primary | ICD-10-CM

## 2021-05-27 DIAGNOSIS — S52.532D CLOSED COLLES' FRACTURE OF LEFT RADIUS WITH ROUTINE HEALING, SUBSEQUENT ENCOUNTER: ICD-10-CM

## 2021-05-27 DIAGNOSIS — S52.572A OTHER CLOSED INTRA-ARTICULAR FRACTURE OF DISTAL END OF LEFT RADIUS, INITIAL ENCOUNTER: Primary | ICD-10-CM

## 2021-05-27 PROCEDURE — 97018 PARAFFIN BATH THERAPY: CPT | Performed by: OCCUPATIONAL THERAPIST

## 2021-05-27 PROCEDURE — 97140 MANUAL THERAPY 1/> REGIONS: CPT | Performed by: OCCUPATIONAL THERAPIST

## 2021-05-27 PROCEDURE — 99024 POSTOP FOLLOW-UP VISIT: CPT | Performed by: PHYSICIAN ASSISTANT

## 2021-05-27 PROCEDURE — 99212 OFFICE O/P EST SF 10 MIN: CPT

## 2021-05-27 PROCEDURE — 73110 X-RAY EXAM OF WRIST: CPT

## 2021-05-27 PROCEDURE — 97110 THERAPEUTIC EXERCISES: CPT | Performed by: OCCUPATIONAL THERAPIST

## 2021-05-27 NOTE — PROGRESS NOTES
OCCUPATIONAL THERAPY PROGRESS NOTE    Date:  2021  Initial Evaluation Date: 2021              Evaluating Therapist: Patience Hay     Patient Name:  Erin Gallegos               :  1985     Restrictions/Precautions:  Per Distal Radius ORIF Protocol (begin at 2 wk mary); NWB L UE; ROM edema control, scar management, desensitization, HEP, modalities PRN  Diagnosis:  L Distal Radius ORIF; S52.572A (ICD-10-CM) - Other closed intra-articular fracture of distal end of left radius, initial encounter  Insurance/Certification information: [address]  Referring Practitioner:  Wilbert Rasheed PA-C  Date of Surgery/Injury: 2021 sx  Plan of care signed (Y/N): Y  Certification: Thru 21  Visit# / total visits:      COVID-19 Screening completed upon entering facility and patient cleared for treatment today. Pt followed all protocols set forth by [address] [place] [address] for Outpatient services throughout therapy session. Patient has been made aware of all new hygiene protocols due to COVID-19 set forth by [address] [place] [address] Outpatient services and agrees to abide by all protocols. Pain Level: no pain reported    Subjective: Pt reports no new changes     Objective:  Updated POC to be completed by last session. INTERVENTION: COMPLETED: SPECIFICS/COMMENTS:   Modality:     Fluidotherapy   15 min with AROM of the wrist/digits to reduce soft tissue stiffness and reduction of pain at the end of the session. Paraffin x 10 min for soft tissue elasticity with MHP and wrist in prolonged extension   MHP     AROM/AAROM:     L Wrist   10 reps ea AROM All Planes - reviewed for HEP  -Jux-A-Ciser 8 reps with elbow on table for wrist isolation.    Towel exercises-Hand scrunches to improve tendon gliding and reduce stiffness ( RD/UD)   Marcela Quivers w/ Wrist  Roll ball forward and backward (flex/ext), side to side (RD/UD), and under/over (pro/sup) x 10   L Forearm  ( supination/pronation tasks)Using yellow pinch pin - pinch beads in pronation and supinate to place in cone  -picking up marbles with pronation and supination with manipulation to the finger tips to place into container.   -Turning over playing cards  -Scooping poms with cone in pronation and supinate to dumb into container   PROM/Stretching:     Red Web Stretches  10 reps wrist flex/et with 10 sec holds   Wrist PROM All Planes  Gentle w/ prolonged holds   Prayer Stretches  10 reps - also  HEP, with extra extension/flexion at the end range. Scar Mass/Edema Control:     Retrograde/Soft Tissue x To hand and wrist to reduce edema and increase soft tissue elasticity   Scar Manipulation x To reduce scar tissue and increase elasticity  Cross friction with mini massager   - rolling therbar over scar site on table for decreasing scar tissue    Therapeutic Activity:     FM Tasks  -Opp pinching marbles- to day small beads into med putty for pinch strengthening  -Boading balls (clockwise and counter)  -Opp pinching with large pegs (in/out of board)  -card shuffling B hands for in hand manipulation        Strengthening:     L hand/wrist x ^ to med orange putty  Gripping- 4 min, rolling, pinching alternating fingers, remove sm beads with lateral pinch, peg removal of 16 pegs with tweezers to improve pinch strength, wrist flexion, wrist extension, putty presses  - calibrated hand gripper using black spring ( 55#) holding for 3 seconds. x15  ^ to 5.0# Digi-Flex: ^ to 3 sets x15 sup, pron, and lateral.   - Mr. Osbaldo Carnes ( up & down =1) with increased from 2 to 3# free weight x6  - increased from 1# to 3.3# ball throwing and catching in LUE 2 sets ^ to 20 rep's and added throw in supination and rotate and catch in pronation 20 rep's .   - ^;ed to mod +  resistance( red -used one set of green and one blue today ) clothes pins to increase pinch strength- 25 lateral and 25 3 -poiont.    - easy putty - lg tools 2 sets for about 4 min each - knob and digging  Tool. jennifer well. Isometric wrist extension  Wrist extension with hands on resistance from therapist  - Pressing ball with both hands and holding for 5 seconds 2 sets x10, pressing against table 2 set x10, 2 sets x10 pressing against the wall. Wrist stability  x True balance 4 mins to improve wrist stability/control. Forearm strengthening x - ^ to 3 # dumbbell, wrist extension, flexion. RD/UD, pronation/supination ^ to 20 rep's  each  - ^'ed  resistance (red- 10# - to green 15 #) therabar-15 reps, supination, pronation, twisting x15 each direction. Other:     Silicone x Silicone gel pad issued to decrease rigidity    Putty x Issued for home- given sm piece of orange to add to her yellow ( 3/4 yellow to 1/4 orange)to make it harder. Assessment/Comments: Pt is making Fair + progress toward stated plan of care. Pt tolerated session well with increased strengthening completed with no increased pain. Pt continues to report increased ability to complete tasks with no pain. Pt will complete discharge next session.     -Rehab Potential: Good  -Requires OT Follow Up: Yes              Time In: 8:00 am            Time Out: 9:00 am     Treatment Charges: Mins Units   Modalities-paraffin 10 1   Ther Exercise 30 2   Manual Therapy 20 1   Thera Activities     ADL/Home Mgt      Neuro Re-education     Gait Training     Group Therapy     Non-Billable Service Time     Other     Total Time/Units 60 4     -Response to Treatment: She responded well to her treatment session. Pleased with how quickly she is progressing. Pt feels she is ready for D/C in 2 sessions. Goal Progress:   GOALS (Long term same as Short term):  1) Patient will demonstrate good understanding of home program (exercises/activities/diagnosis/prognosis/goals) with good accuracy. Progressing  2) Patient will demonstrate increased active/passive range of motion of their L wrist to [facility] for ADL/IADL completion.     Progressing 3) Patient will demonstrate increased /pinch strength of at least 10 / 2-5 pinch pounds of their L hand when appropriate. Progressing  4) Patient to report 100% compliance with their splint wear, care, and precautions if needed. Discharged. To use at night/during the day as needed however not using currently  5) Patient to demonstrate decreased guarding of their affected extremity from 100% to 20% or less. Met. Pt reports using her LUE daily at work. 6) Patient will be knowledgeable of edema control techniques as evident with decreases from moderate to mild/none. Progressing  7) Patient will demonstrate a non-tender/non-adherent scar. Progressing  8) Patient will report ADL functions as Mod I/I using L UE. Progressing  9) Patient will decrease QuickDASH score to 20% or less for increased participation in daily functional activities. Progressing    Plan:   [x]  Continues Plan of care: Frequency/Duration 2-3x / week for 79 [id number].   Certification period From: 3/29/2021  To: 7/21/2021  Treatment covered based on POC and graduated to patient's progress. Pt education continues at each visit to obtain maximum benefits from skilled OT intervention.   []  Alter Plan of care:   []  Discharge:    rBia Jimenes OT /L, CHT

## 2021-05-27 NOTE — PROGRESS NOTES
Patient here for a 12 week postop check left distal radius ORIF. DOS 03/03/2021. Patient mentioned that there is minimal tightness on the inner side of wrist pending the way she move her wrist/hand around. Denies any pain. Patient states that she is has tenderness at the incision site, unsure if it is a suture inside that is causing the discomfort.             Electronically signed by Mary Girard MA on 5/27/2021 at 12:51 PM

## 2021-05-27 NOTE — PROGRESS NOTES
Chief Complaint   Patient presents with    Post-Op Check     left wrist       OP:SURGEON: Dr. Sunny Granados, DO  DATE OF PROCEDURE: 3/3/21  PROCEDURE: LEFT DISTAL RADIUS INTRAARTICULAR FRACTURE OPEN REDUCTION INTERNAL FIXATION    Subjective:  Alexis Hathaway is approximately 3 months the above date of surgery. She has completely discontinued her Velcro wrist brace. Has a couple remaining occupational therapy sessions and is compliant with home exercises. States that she went to the gym last week and lifted up to 8 pounds before she noticed left wrist pain. She does still use her Velcro wrist brace very intermittently only for breakthrough pain and states that her pain after going to the gym completely resolved spontaneously. Overall states that she was doing well and does have some pain about her scar to the volar aspect of the wrist and does have intermittent radiating pain up the forearm. Review of Systems -    General ROS: negative for - chills, fatigue, fever or night sweats  Respiratory ROS: no cough, shortness of breath, or wheezing  Cardiovascular ROS: no chest pain or dyspnea on exertion  Gastrointestinal ROS: no abdominal pain, nausea, vomiting, diarrhea, constipation,or black or bloody stools  Genitourinary: no hematuria, dysuria, or incontinence   Musculoskeletal ROS: negative for -back or neck pain or stiffness, also see HPI  Neurological ROS: no TIA or stroke symptoms       Objective:    General: Alert and oriented X 3, normocephalic atraumatic, external ears and eye normal, sclera clear, no acute distress, respirations easy and unlabored with no audible wheezes, skin warm and dry, speech and dress appropriate for noted age, affect euthymic.     Extremity:  Left Upper Extremity  Skin is clean dry and intact  No edema noted  Radial pulse palpable, fingers warm with BCR  Flex/extension intact to wrist, thumb and fingers  Finger opposition intact  Finger adduction/abduction intact  Finger crossover intact  Subjectively states sensation intact to radial/medial/ulnar distribution  Incision healed with hypertrophic scar with mild TTP to the radial aspect of the mid scar without palpable masses or nodules signaling retained suture  Mildly TTP about the ulnar aspect of the wrist         Ht 5' 3\" (1.6 m)   Wt 240 lb (108.9 kg)   BMI 42.51 kg/m²     XR:   3 views of L wrist demonstrating interval healing of distal radius fracture. Ulnar styloid avulsion fracture again visualized. Hardware remains intact without interval displacement, loosening, or failure. No acute fractures or dislocations or any other osseus abnormality identified. Assessment:   Diagnosis Orders   1. Closed Colles' fracture of left radius with routine healing, subsequent encounter  XR WRIST LEFT (MIN 3 VIEWS)       Plan:   Reviewed x-rays with patient today in office    Can increase weight bearing as tolerated L UE   Can continue to wear velcro wrist brace if needed, but okay to completely discontinue this    Continue remaining OT sessions and HEP   Continue scar massage daily    Follow up in 2-3 months with repeat XR    Electronically signed by Ana Cristina Spencer PA-C on 5/27/2021 at 1:14 PM  Note: This report was completed using [employer] voiced recognition software. Every effort has been made to ensure accuracy; however, inadvertent computerized transcription errors may be present.

## 2021-06-01 ENCOUNTER — TREATMENT (OUTPATIENT)
Dept: OCCUPATIONAL THERAPY | Age: 36
End: 2021-06-01
Payer: MEDICAID

## 2021-06-01 DIAGNOSIS — S52.572A OTHER CLOSED INTRA-ARTICULAR FRACTURE OF DISTAL END OF LEFT RADIUS, INITIAL ENCOUNTER: Primary | ICD-10-CM

## 2021-06-01 PROCEDURE — 97110 THERAPEUTIC EXERCISES: CPT | Performed by: OCCUPATIONAL THERAPIST

## 2021-06-01 PROCEDURE — 97018 PARAFFIN BATH THERAPY: CPT | Performed by: OCCUPATIONAL THERAPIST

## 2021-06-01 PROCEDURE — 97140 MANUAL THERAPY 1/> REGIONS: CPT | Performed by: OCCUPATIONAL THERAPIST

## 2021-06-01 NOTE — PROGRESS NOTES
OCCUPATIONAL THERAPY PROGRESS NOTE/DISCHARGE SUMMARY    Date:  2021  Initial Evaluation Date: 2021              Evaluating Therapist: Cristina Alicia     Patient Name:  Jocelin Mac               :  1985     Restrictions/Precautions:  Per Distal Radius ORIF Protocol (begin at 2 wk mary); NWB L UE; ROM edema control, scar management, desensitization, HEP, modalities PRN  Diagnosis:  L Distal Radius ORIF; S52.572A (ICD-10-CM) - Other closed intra-articular fracture of distal end of left radius, initial encounter  Insurance/Certification information: [address],[facility]  Referring Practitioner:  Adele Barraza PA-C  Date of Surgery/Injury: 2021 sx  Plan of care signed (Y/N): Y  Certification: Thru 21  Visit# / total visits:      COVID-19 Screening completed upon entering facility and patient cleared for treatment today. Pt followed all protocols set forth by [address] [place] [address] for Outpatient services throughout therapy session. Patient has been made aware of all new hygiene protocols due to COVID-19 set forth by [address] [place] [address] Outpatient services and agrees to abide by all protocols. Pain Level: no pain reported    Subjective: Pt reports no new changes     Objective:  Updated POC to be completed by last session. INTERVENTION: COMPLETED: SPECIFICS/COMMENTS:   Modality:     Fluidotherapy   15 min with AROM of the wrist/digits to reduce soft tissue stiffness and reduction of pain at the end of the session. Paraffin x 10 min for soft tissue elasticity with MHP and wrist in prolonged extension   MHP     AROM/AAROM:     L Wrist  x 10 reps ea AROM All Planes - reviewed for HEP  -Jux-A-Ciser 8 reps with elbow on table for wrist isolation.    Towel exercises-Hand scrunches to improve tendon gliding and reduce stiffness ( RD/UD)   Zulay Singh w/ Wrist  Roll ball forward and backward (flex/ext), side to side (RD/UD), and under/over (pro/sup) x 10   L Forearm each - knob and digging  Tool. jennifer well. Isometric wrist extension  Wrist extension with hands on resistance from therapist  - Pressing ball with both hands and holding for 5 seconds 2 sets x10, pressing against table 2 set x10, 2 sets x10 pressing against the wall. Wrist stability  x True balance 4 mins to improve wrist stability/control. Forearm strengthening x - ^ to 3 # dumbbell, wrist extension, flexion. RD/UD, pronation/supination ^ to 20 rep's  each  - ^'ed  resistance (red- 10# - to green 15 #) therabar-15 reps, supination, pronation, twisting x15 each direction. Other:     Silicone x Silicone gel pad issued to decrease rigidity    Putty x Issued for home- given sm piece of orange to add to her yellow ( 3/4 yellow to 1/4 orange)to make it harder. Assessment/Comments: Pt is making Fair + progress toward stated plan of care.  Pt tolerated session well-pt demonstrated and verbalized understanding of HEP and instructed to continue with HEP and to call this facility with any questions or concerns.            Left Upper Extremity ROM  /  KEY: Ext/Flex     AROM(PROM)                                                                                                05/06/21 6/1/21  Forearm Pronation: 0/76-84* WFLs     WFL     Supination: 0/80* 0/52* 0/68*   0/85* WFL      Wrist Flexion: 0/60-80* 0/43*   0/68*   0/63* 0/87     Extension: 0/60-75* 0/40*  0/65* 0/72*  0/75     Radial Deviation: 0/20-25* 0/15*   0/20*   0/23* 0/23     Ulnar Deviation: 0/30-45* 0/28*  0/40*  0/45* 0/30      Dynamometer (setting 2):  cleared for light strengthening on 4/13/2021                                Left: 46#, 45# , 52#   6/1/21                                          Right: 71#  , 65#   6/1/21                Pinch Meter:              Lateral: Left= 15#, 15#,  15#   6/1/21                     Right= 17#,    15#   6/1/21              Palmar 3 point: Left= 11#,10#,   11#   6/1/21                     Right= 15#, 12#   6/1/21    QuickDASH Score: 59.1% disability, 4.5% disability   6/1/21    -Rehab Potential: Good  -Requires OT Follow Up: Yes              Time In: 8:00 am            Time Out: 9:00 am     Treatment Charges: Mins Units   Modalities-paraffin 10 1   Ther Exercise 30 2   Manual Therapy 20 1   Thera Activities     ADL/Home Mgt      Neuro Re-education     Gait Training     Group Therapy     Non-Billable Service Time     Other     Total Time/Units 60 4     -Response to Treatment: She responded well to her treatment session. Pleased with how quickly she is progressing. Pt feels she is ready for D/C in 2 sessions. Goal Progress:   GOALS (Long term same as Short term):  1) Patient will demonstrate good understanding of home program (exercises/activities/diagnosis/prognosis/goals) with good accuracy. Goal met.    6/1/21  2) Patient will demonstrate increased active/passive range of motion of their L wrist to [facility] for ADL/IADL completion. Goal met.   6/1/21  3) Patient will demonstrate increased /pinch strength of at least 10 / 2-5 pinch pounds of their L hand when appropriate. Goal met.    6/1/21  4) Patient to report 100% compliance with their splint wear, care, and precautions if needed. Discharged. To use at night/during the day as needed however not using currently  5) Patient to demonstrate decreased guarding of their affected extremity from 100% to 20% or less. Met. Pt reports using her LUE daily at work. 6) Patient will be knowledgeable of edema control techniques as evident with decreases from moderate to mild/none. Goal met.   6/1/21  7) Patient will demonstrate a non-tender/non-adherent scar. Goal progressing well with good HEP carry over with scar massage techniques.    6/1/21  8) Patient will report ADL functions as Mod I/I using L UE. Goal met   6/1/21  9) Patient will decrease QuickDASH score to 20% or less for increased participation in daily functional activities.    Goal met.    6/1/21    Plan:   []  Continues Plan of care: Frequency/Duration 2-3x / week for 91 NWETGB.   Certification period From: 3/29/2021  To: 7/21/2021  Treatment covered based on POC and graduated to patient's progress. Pt education continues at each visit to obtain maximum benefits from skilled OT intervention. []  Alter Plan of care:    [x]  Discharge: Pt instructed to continue HEP and follow up with referring physician-call this facility with any questions or concerns.      [address] [facility] OTR/L #[id number]

## 2022-01-30 ENCOUNTER — HOSPITAL ENCOUNTER (EMERGENCY)
Age: 37
Discharge: HOME | End: 2022-01-30
Attending: EMERGENCY MEDICINE
Payer: MEDICAID

## 2022-01-30 ENCOUNTER — APPOINTMENT (OUTPATIENT)
Dept: CT IMAGING | Age: 37
End: 2022-01-30
Payer: MEDICAID

## 2022-01-30 VITALS
SYSTOLIC BLOOD PRESSURE: 142 MMHG | WEIGHT: 250 LBS | BODY MASS INDEX: 44.3 KG/M2 | DIASTOLIC BLOOD PRESSURE: 84 MMHG | TEMPERATURE: 98.9 F | HEART RATE: 98 BPM | OXYGEN SATURATION: 98 % | HEIGHT: 63 IN | RESPIRATION RATE: 16 BRPM

## 2022-01-30 DIAGNOSIS — S05.02XA ABRASION OF LEFT CORNEA, INITIAL ENCOUNTER: Primary | ICD-10-CM

## 2022-01-30 PROCEDURE — 6370000000 HC RX 637 (ALT 250 FOR IP): Performed by: STUDENT IN AN ORGANIZED HEALTH CARE EDUCATION/TRAINING PROGRAM

## 2022-01-30 PROCEDURE — 72125 CT NECK SPINE W/O DYE: CPT

## 2022-01-30 PROCEDURE — 99284 EMERGENCY DEPT VISIT MOD MDM: CPT

## 2022-01-30 PROCEDURE — 90714 TD VACC NO PRESV 7 YRS+ IM: CPT | Performed by: STUDENT IN AN ORGANIZED HEALTH CARE EDUCATION/TRAINING PROGRAM

## 2022-01-30 PROCEDURE — 70486 CT MAXILLOFACIAL W/O DYE: CPT

## 2022-01-30 PROCEDURE — 90471 IMMUNIZATION ADMIN: CPT | Performed by: STUDENT IN AN ORGANIZED HEALTH CARE EDUCATION/TRAINING PROGRAM

## 2022-01-30 PROCEDURE — 6360000002 HC RX W HCPCS: Performed by: STUDENT IN AN ORGANIZED HEALTH CARE EDUCATION/TRAINING PROGRAM

## 2022-01-30 PROCEDURE — 70450 CT HEAD/BRAIN W/O DYE: CPT

## 2022-01-30 RX ORDER — TETANUS AND DIPHTHERIA TOXOIDS ADSORBED 2; 2 [LF]/.5ML; [LF]/.5ML
0.5 INJECTION INTRAMUSCULAR ONCE
Status: COMPLETED | OUTPATIENT
Start: 2022-01-30 | End: 2022-01-30

## 2022-01-30 RX ORDER — TETRACAINE HYDROCHLORIDE 5 MG/ML
1 SOLUTION OPHTHALMIC ONCE
Status: COMPLETED | OUTPATIENT
Start: 2022-01-30 | End: 2022-01-30

## 2022-01-30 RX ORDER — ERYTHROMYCIN 5 MG/G
OINTMENT OPHTHALMIC 4 TIMES DAILY
Qty: 3.5 G | Refills: 0 | Status: SHIPPED | OUTPATIENT
Start: 2022-01-30 | End: 2022-02-04

## 2022-01-30 RX ADMIN — TETANUS AND DIPHTHERIA TOXOIDS ADSORBED 0.5 ML: 2; 2 INJECTION INTRAMUSCULAR at 07:48

## 2022-01-30 RX ADMIN — TETRACAINE HYDROCHLORIDE 1 DROP: 5 SOLUTION OPHTHALMIC at 06:36

## 2022-01-30 ASSESSMENT — ENCOUNTER SYMPTOMS
ABDOMINAL PAIN: 0
EYE ITCHING: 0
WHEEZING: 0
SHORTNESS OF BREATH: 0
DIARRHEA: 0
EYE PAIN: 1
SORE THROAT: 0
RHINORRHEA: 0
COUGH: 0
PHOTOPHOBIA: 1
SINUS PAIN: 0
BACK PAIN: 0
EYE REDNESS: 0
VOMITING: 0
NAUSEA: 0
EYE DISCHARGE: 0
TROUBLE SWALLOWING: 0

## 2022-01-30 ASSESSMENT — PAIN DESCRIPTION - LOCATION: LOCATION: EYE

## 2022-01-30 ASSESSMENT — PAIN DESCRIPTION - ORIENTATION: ORIENTATION: LEFT

## 2022-01-30 NOTE — PROGRESS NOTES
Patient's jewelry removed and placed in an envelope. Envelope given to patient. Patient left CT department with jewelry.  (2 pairs of earrings)
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
## 2022-03-13 ENCOUNTER — APPOINTMENT (OUTPATIENT)
Dept: CT IMAGING | Age: 37
End: 2022-03-13
Payer: MEDICAID

## 2022-03-13 ENCOUNTER — APPOINTMENT (OUTPATIENT)
Dept: GENERAL RADIOLOGY | Age: 37
End: 2022-03-13
Payer: MEDICAID

## 2022-03-13 ENCOUNTER — HOSPITAL ENCOUNTER (EMERGENCY)
Age: 37
Discharge: HOME | End: 2022-03-13
Attending: EMERGENCY MEDICINE
Payer: MEDICAID

## 2022-03-13 VITALS
SYSTOLIC BLOOD PRESSURE: 149 MMHG | TEMPERATURE: 97.8 F | OXYGEN SATURATION: 97 % | HEART RATE: 84 BPM | HEIGHT: 63 IN | WEIGHT: 242 LBS | RESPIRATION RATE: 16 BRPM | BODY MASS INDEX: 42.88 KG/M2 | DIASTOLIC BLOOD PRESSURE: 92 MMHG

## 2022-03-13 DIAGNOSIS — M54.2 NECK PAIN: ICD-10-CM
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
## 2022-03-13 DIAGNOSIS — M79.671 RIGHT FOOT PAIN: ICD-10-CM

## 2022-03-13 DIAGNOSIS — S93.401A SPRAIN OF RIGHT ANKLE, UNSPECIFIED LIGAMENT, INITIAL ENCOUNTER: Primary | ICD-10-CM

## 2022-03-13 LAB
HCG, URINE, POC: NEGATIVE
Lab: NORMAL
NEGATIVE QC PASS/FAIL: NORMAL
POSITIVE QC PASS/FAIL: NORMAL

## 2022-03-13 PROCEDURE — 70450 CT HEAD/BRAIN W/O DYE: CPT

## 2022-03-13 PROCEDURE — 99284 EMERGENCY DEPT VISIT MOD MDM: CPT

## 2022-03-13 PROCEDURE — 72125 CT NECK SPINE W/O DYE: CPT

## 2022-03-13 PROCEDURE — 71045 X-RAY EXAM CHEST 1 VIEW: CPT

## 2022-03-13 PROCEDURE — 73630 X-RAY EXAM OF FOOT: CPT

## 2022-03-13 PROCEDURE — 73610 X-RAY EXAM OF ANKLE: CPT

## 2022-03-13 RX ORDER — IBUPROFEN 800 MG/1
800 TABLET ORAL EVERY 8 HOURS PRN
Qty: 15 TABLET | Refills: 0 | Status: SHIPPED | OUTPATIENT
Start: 2022-03-13 | End: 2022-04-08 | Stop reason: ALTCHOICE

## 2022-03-13 ASSESSMENT — PAIN DESCRIPTION - ORIENTATION: ORIENTATION: RIGHT

## 2022-03-13 ASSESSMENT — PAIN SCALES - GENERAL: PAINLEVEL_OUTOF10: 7

## 2022-03-13 ASSESSMENT — PAIN - FUNCTIONAL ASSESSMENT: PAIN_FUNCTIONAL_ASSESSMENT: 0-10

## 2022-03-13 ASSESSMENT — PAIN DESCRIPTION - LOCATION: LOCATION: ANKLE;NECK

## 2022-03-13 ASSESSMENT — PAIN DESCRIPTION - PAIN TYPE: TYPE: ACUTE PAIN

## 2022-03-13 ASSESSMENT — PAIN DESCRIPTION - DESCRIPTORS: DESCRIPTORS: ACHING;SORE;SPASM
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
## 2022-04-08 ENCOUNTER — HOSPITAL ENCOUNTER (EMERGENCY)
Age: 37
Discharge: HOME | End: 2022-04-08
Payer: MEDICAID

## 2022-04-08 VITALS
WEIGHT: 258 LBS | OXYGEN SATURATION: 97 % | HEART RATE: 81 BPM | DIASTOLIC BLOOD PRESSURE: 78 MMHG | BODY MASS INDEX: 45.7 KG/M2 | SYSTOLIC BLOOD PRESSURE: 122 MMHG | TEMPERATURE: 98 F | RESPIRATION RATE: 16 BRPM

## 2022-04-08 DIAGNOSIS — J10.1 INFLUENZA A: Primary | ICD-10-CM

## 2022-04-08 LAB
INFLUENZA A: DETECTED
INFLUENZA B: NOT DETECTED
SARS-COV-2 RNA, RT PCR: NOT DETECTED

## 2022-04-08 PROCEDURE — 6370000000 HC RX 637 (ALT 250 FOR IP): Performed by: PHYSICIAN ASSISTANT

## 2022-04-08 PROCEDURE — 87636 SARSCOV2 & INF A&B AMP PRB: CPT

## 2022-04-08 PROCEDURE — 99283 EMERGENCY DEPT VISIT LOW MDM: CPT

## 2022-04-08 RX ORDER — OSELTAMIVIR PHOSPHATE 75 MG/1
75 CAPSULE ORAL 2 TIMES DAILY
Qty: 10 CAPSULE | Refills: 0 | Status: SHIPPED | OUTPATIENT
Start: 2022-04-08 | End: 2022-04-13

## 2022-04-08 RX ORDER — IBUPROFEN 600 MG/1
600 TABLET ORAL 3 TIMES DAILY PRN
Qty: 30 TABLET | Refills: 0 | Status: SHIPPED | OUTPATIENT
Start: 2022-04-08

## 2022-04-08 RX ORDER — BROMPHENIRAMINE MALEATE, PSEUDOEPHEDRINE HYDROCHLORIDE, AND DEXTROMETHORPHAN HYDROBROMIDE 2; 30; 10 MG/5ML; MG/5ML; MG/5ML
5 SYRUP ORAL 4 TIMES DAILY PRN
Qty: 120 ML | Refills: 0 | Status: SHIPPED | OUTPATIENT
Start: 2022-04-08 | End: 2022-04-13

## 2022-04-08 RX ORDER — IBUPROFEN 600 MG/1
600 TABLET ORAL ONCE
Status: COMPLETED | OUTPATIENT
Start: 2022-04-08 | End: 2022-04-08

## 2022-04-08 RX ADMIN — IBUPROFEN 600 MG: 600 TABLET ORAL at 18:02

## 2022-04-08 ASSESSMENT — PAIN SCALES - GENERAL: PAINLEVEL_OUTOF10: 5

## 2022-04-08 NOTE — ED PROVIDER NOTES
[facility]                                                                                                                                    Department of Emergency Medicine   ED  Provider Note  Admit Date/RoomTime: 4/8/2022  5:02 PM  ED Room: [facility] E/MADSEN-E        HPI:  4/8/22,   Time: 5:50 PM EDT         Luis Fernando Ramirez is a 40 y.o. female presenting to the ED for body aches, sore throat, cough, and ear pain, beginning 1 day ago. The complaint has been persistent, moderate in severity, and worsened by nothing. The patient is reporting some ear pain sore throat and postnasal drip as well. No documented fevers. She has had some chills. The cough is nonproductive. The patient states she was not vaccinated for COVID-19 or influenza. She did do a Covid test earlier today that was negative. The patient is not a smoker. She denies any shortness of breath or chest pain. She is not aware of any known exposure. ROS:     Constitutional: Negative for fever and chills  HENT:  See HPI  Eyes: Negative for pain, discharge and redness  Respiratory:  See HPI  Cardiovascular: Negative for CP, edema or palpitations  Gastrointestinal: Negative for nausea, vomiting, diarrhea and abdominal distention  Genitourinary: Negative for dysuria and frequency  Musculoskeletal: Negative for back pain and arthralgia  Skin: Negative for rash and wound  Neurological: Negative for weakness and headaches  Hematological: Negative for adenopathy    All other systems reviewed and are negative      -------------------------------- PAST HISTORY ----------------------------------  Past Medical History:  has a past medical history of Anxiety, Depression, and Pulmonary embolism ([facility] [address] Past Surgical History:  has a past surgical history that includes Tonsillectomy and Wrist fracture surgery (Left, 3/3/2021). Social History:  reports that she has never smoked. She has never used smokeless tobacco. She reports current alcohol use. She reports that she does not use drugs. Family History: family history is not on file. The patients home medications have been reviewed. Allergies: Patient has no known allergies. --------------------------------- RESULTS ------------------------------------------  All laboratory and radiology results have been personally reviewed by myself   LABS:  Results for orders placed or performed during the hospital encounter of 04/08/22   COVID-19 & Influenza Combo    Specimen: Nasopharyngeal Swab   Result Value Ref Range    SARS-CoV-2 RNA, RT PCR NOT DETECTED NOT DETECTED    INFLUENZA A DETECTED (A) NOT DETECTED    INFLUENZA B NOT DETECTED NOT DETECTED       RADIOLOGY:  Interpreted by Radiologist.  No orders to display       ----------------- NURSING NOTES AND VITALS REVIEWED ---------------   The nursing notes within the ED encounter and vital signs as below have been reviewed. /78   Pulse 81   Temp 98 °F (36.7 °C)   Resp 16   Wt 258 lb (117 kg)   LMP  (LMP Unknown)   SpO2 97%   BMI 45.70 kg/m²   Oxygen Saturation Interpretation: Normal      --------------------------------PHYSICAL EXAM------------------------------------      Constitutional/General: Alert and oriented x3, ill appearing. Nontoxic  Head: NC/AT  Eyes: PERRL, EOMI  TM's intact bilaterally. Dull. Posterior pharynx with mild erythema. Mouth: Oropharynx clear, handling secretions, no trismus  Neck: Supple, full ROM, no meningeal signs  Pulmonary: Lungs clear to auscultation bilaterally, no wheezes, rales, or rhonchi. Not in respiratory distress  Cardiovascular:  Regular rate and rhythm, no murmurs, gallops, or rubs. 2+ distal pulses  Extremities: Moves all extremities x 4. Warm and well perfused  Skin: warm and dry without rash  Neurologic: GCS 15,  Intact.   No focal deficits  Psych: Normal Affect      ------------------------ ED COURSE/MEDICAL DECISION MAKING----------------------  Medications   ibuprofen (ADVIL;MOTRIN)

## 2022-04-08 NOTE — ED NOTES
Discharge instructions given and pt verbalized understanding. Gait steady. No distress noted.      Huber Kim RN  04/08/22 3012

## 2022-04-08 NOTE — Clinical Note
Sonya Taylor was seen and treated in our emergency department on 4/8/2022. She may return to work on 04/11/2022. If you have any questions or concerns, please don't hesitate to call.       Maicol James PA-C

## 2022-04-13 ENCOUNTER — OFFICE VISIT (OUTPATIENT)
Dept: FAMILY MEDICINE CLINIC | Age: 37
End: 2022-04-13
Payer: MEDICAID

## 2022-04-13 VITALS
BODY MASS INDEX: 46.95 KG/M2 | OXYGEN SATURATION: 99 % | WEIGHT: 265 LBS | SYSTOLIC BLOOD PRESSURE: 116 MMHG | DIASTOLIC BLOOD PRESSURE: 74 MMHG | HEIGHT: 63 IN | TEMPERATURE: 96.9 F | RESPIRATION RATE: 16 BRPM | HEART RATE: 58 BPM

## 2022-04-13 DIAGNOSIS — F32.89 OTHER DEPRESSION: ICD-10-CM

## 2022-04-13 DIAGNOSIS — J10.1 INFLUENZA A: ICD-10-CM

## 2022-04-13 DIAGNOSIS — F41.9 ANXIETY: ICD-10-CM

## 2022-04-13 DIAGNOSIS — Z00.00 ENCOUNTER FOR WELL ADULT EXAM WITHOUT ABNORMAL FINDINGS: ICD-10-CM

## 2022-04-13 DIAGNOSIS — Z76.89 ESTABLISHING CARE WITH NEW DOCTOR, ENCOUNTER FOR: Primary | ICD-10-CM

## 2022-04-13 PROCEDURE — 99385 PREV VISIT NEW AGE 18-39: CPT | Performed by: INTERNAL MEDICINE

## 2022-04-13 SDOH — ECONOMIC STABILITY: FOOD INSECURITY: WITHIN THE PAST 12 MONTHS, YOU WORRIED THAT YOUR FOOD WOULD RUN OUT BEFORE YOU GOT MONEY TO BUY MORE.: NEVER TRUE

## 2022-04-13 SDOH — ECONOMIC STABILITY: FOOD INSECURITY: WITHIN THE PAST 12 MONTHS, THE FOOD YOU BOUGHT JUST DIDN'T LAST AND YOU DIDN'T HAVE MONEY TO GET MORE.: NEVER TRUE

## 2022-04-13 ASSESSMENT — PATIENT HEALTH QUESTIONNAIRE - PHQ9
1. LITTLE INTEREST OR PLEASURE IN DOING THINGS: 1
SUM OF ALL RESPONSES TO PHQ QUESTIONS 1-9: 2
2. FEELING DOWN, DEPRESSED OR HOPELESS: 1
SUM OF ALL RESPONSES TO PHQ QUESTIONS 1-9: 2
SUM OF ALL RESPONSES TO PHQ9 QUESTIONS 1 & 2: 2
SUM OF ALL RESPONSES TO PHQ QUESTIONS 1-9: 2
SUM OF ALL RESPONSES TO PHQ QUESTIONS 1-9: 2

## 2022-04-13 ASSESSMENT — SOCIAL DETERMINANTS OF HEALTH (SDOH): HOW HARD IS IT FOR YOU TO PAY FOR THE VERY BASICS LIKE FOOD, HOUSING, MEDICAL CARE, AND HEATING?: NOT HARD AT ALL

## 2022-04-13 NOTE — PROGRESS NOTES
[facility]  [address] [place] [address]  Dept: [phone]  Dept Fax: [phone]     NAME: Kenyetta Workman        :  1985        MRN:  [id number]    Chief Complaint   Patient presents with    New Patient     pt just getting over Flu, needing to establish, pt wanting to see counselor again       History of Present Illness  Kenyetta Workman is a 40 y.o. female who presents today to establish care. Patient was diagnosed with influenza A on 2022. She was started on Tamiflu at that time. She is finishing up her course of Tamiflu with her last dose being taken today. Her symptoms are nearly resolved and she is feeling much better at this time. Patient does have a history of anxiety and depression. She states that she was previously seeing a counselor in [facility]. She is interested in getting reestablished with counseling at this time. She does want to try to avoid medications if possible as she did very well in counseling previously. Review of Systems  Please see HPI above. All bolded are positive.   Gen: fever, chills, fatigue, weakness, diaphoresis, or unintentional weight change  Head: headache, vision change, hearing loss  Chest: chest pain/heaviness, palpitations  Lungs: shortness of breath, wheezing, coughing, hemoptysis  Abdomen: abdominal pain, nausea, vomiting, diarrhea, constipation, melena, hematochezia, hematemesis, or loss of appetite  Extremities: lower extremity edema, myalgias, arthralgias  Urinary: dysuria, hematuria, weak flow, or increase in frequency  Neurologic: lightheadedness, dizziness, confusion, syncope  Endocrine: polydipsia, polyuria, heat or cold intolerance  Psychiatric: depression, suicidal ideation, anxiety  Derm: Rashes, ulcers, burns    Medical History   Past Medical History:   Diagnosis Date    Anxiety     Depression     Pulmonary embolism ([facility] [address]        Surgical History   Past Surgical History:   Procedure Laterality Date    TONSILLECTOMY      WRIST FRACTURE SURGERY Left 3/3/2021    LEFT DISTAL RADIUS OPEN REDUCTION INTERNAL FIXATION performed by DO Jerome at [facility] OR       Family History  No family history on file. Social History  Social History     Tobacco Use    Smoking status: Never Smoker    Smokeless tobacco: Never Used   Substance Use Topics    Alcohol use: Yes     Comment: social       Home Medications  Current Outpatient Medications   Medication Sig Dispense Refill    ibuprofen (ADVIL;MOTRIN) 600 MG tablet Take 1 tablet by mouth 3 times daily as needed for Pain 30 tablet 0     No current facility-administered medications for this visit. Allergies  No Known Allergies    Objective  Vitals:    04/13/22 1356   BP: 116/74   Pulse: 58   Resp: 16   Temp: 96.9 °F (36.1 °C)   TempSrc: Infrared   SpO2: 99%   Weight: 265 lb (120.2 kg)   Height: 5' 3\" (1.6 m)        Physical Exam:  General: Awake, alert, and oriented to person, place, time, and purpose, appears stated age and cooperative, No acute distress  Head: Normocephalic, atraumatic  Eyes: conjunctivae/corneas clear, EOM's intact. Mouth: Mucous membranes moist with no pharyngeal exudate or erythema  Neck: no JVD, no adenopathy, no carotid bruit, supple, symmetrical, trachea midline  Back: symmetric, ROM normal, No CVA tenderness. Lungs: clear to auscultation bilaterally without wheezes, rales, or rhonchi  Heart: regular rate and rhythm, S1, S2 normal, no murmur, click, rub or gallop  Abdomen: soft, non-tender; bowel sounds normal; no masses,  no organomegaly  Extremities: atraumatic, no cyanosis, no edema  Skin: color, texture, turgor within normal limits.  No rashes or lesions  Neurologic: speech appropriate, moves all 4 extremities, normal muscle strength and tone, CN 2-12 grossly intact    Labs  Lab Results   Component Value Date    WBC 8.8 04/05/2019    HGB 14.0 04/05/2019    HCT 42.3 04/05/2019  04/05/2019     04/05/2019    K 5.0 04/05/2019     04/05/2019    CREATININE 0.8 04/05/2019    BUN 11 04/05/2019    CO2 25 04/05/2019    GLUCOSE 101 (H) 04/05/2019    ALT 16 04/05/2019    AST 30 04/05/2019     No results found for: TSH  No results found for: TRIG  No results found for: HDL  No results found for: LDLCALC  No results found for: LABA1C  No results found for: INR, PROTIME   *All recent labs were reviewed. Please see electronic chart for a more comprehensive set of labs    Radiology  No results found. Health Maintenance Due   Topic Date Due    Varicella vaccine (1 of 2 - 2-dose childhood series) Never done    COVID-19 Vaccine (1) Never done    HIV screen  Never done    Hepatitis C screen  Never done    Diabetes screen  Never done         Assessment and Plan  Bobbi Amador presents today to establish care. Latrice Ma was seen today for new patient. Diagnoses and all orders for this visit:    Establishing care with new doctor, encounter for    Influenza A    Other depression  -     External Referral To Counseling Services    Anxiety  -     External Referral To Counseling Services    Encounter for well adult exam without abnormal findings    Patient's influenza A infection is resolving. List of counseling services given to the patient and she was encouraged to start calling around to establish with one. Educational materials and/or home exercises printed for patient's review and were included in patient instructions on his/her After Visit Summary and given to patient at the end of visit. Counseled regarding above diagnosis, including possible risks and complications, especially if left uncontrolled. Counseled regarding the possible side effects, risks, benefits and alternatives to treatment; patient and/or guardian verbalizes understanding, agrees, feels comfortable with and wishes to proceed with above treatment plan.      Advised patient to call [facility] new medication issues, and read all Rx info from pharmacy to assure aware of all possible risks and side effects of medication before taking. Reviewed age and gender appropriate health screening exams and vaccinations. Advised patient regarding importance of keeping up with recommended health maintenance and to schedule as soon as possible if overdue, as this is important in assessing for undiagnosed pathology, especially cancer, as well as protecting against potentially harmful/life threatening disease. Patient verbalizes understanding and agrees with above counseling, assessment and plan. All questions answered.     Amber Yee, DO

## 2022-08-03 NOTE — PROGRESS NOTES
OCCUPATIONAL THERAPY PROGRESS NOTE    Date:  2021  Initial Evaluation Date: 2021              Evaluating Therapist: Elliott Griffiths     Patient Name:  Shruthi Fay               :  1985     Restrictions/Precautions:  Per Distal Radius ORIF Protocol (begin at 2 wk mary); NWB L UE; ROM edema control, scar management, desensitization, HEP, modalities PRN  Diagnosis:  L Distal Radius ORIF; S52.572A (ICD-10-CM) - Other closed intra-articular fracture of distal end of left radius, initial encounter  Insurance/Certification information: [address]  Referring Practitioner:  Wanda Michaels PA-C  Date of Surgery/Injury: 2021 sx  Plan of care signed (Y/N): Y  Certification: Thru 21  Visit# / total visits: 15 / 25     COVID-19 Screening completed upon entering facility and patient cleared for treatment today. Pt followed all protocols set forth by [address] [place] [address] for Outpatient services throughout therapy session. Patient has been made aware of all new hygiene protocols due to COVID-19 set forth by [address] [place] [address] Outpatient services and agrees to abide by all protocols. Pain Level: no new changes    Subjective: Pt reports slight discomfort on the ulnar side. Objective:  Updated POC to be completed by 10  session. INTERVENTION: COMPLETED: SPECIFICS/COMMENTS:   Modality:     Fluidotherapy   15 min with AROM of the wrist/digits to reduce soft tissue stiffness and reduction of pain at the end of the session. Paraffin x 10 min for soft tissue elasticity with MHP and wrist in prolonged extension   MHP     AROM/AAROM:     L Wrist  x 10 reps ea AROM All Planes - reviewed for HEP  -Jux-A-Ciser 8 reps with elbow on table for wrist isolation.    Towel exercises-Hand scrunches to improve tendon gliding and reduce stiffness ( RD/UD)   Constance Romano w/ Wrist  Roll ball forward and backward (flex/ext), side to side (RD/UD), and under/over (pro/sup) x 10   L Forearm  ( supination/pronation tasks)Using yellow pinch pin - pinch beads in pronation and supinate to place in cone  -picking up marbles with pronation and supination with manipulation to the finger tips to place into container.   -Turning over playing cards  -Scooping poms with cone in pronation and supinate to dumb into container   PROM/Stretching:     Red Web Stretches  10 reps wrist flex/et with 10 sec holds   Wrist PROM All Planes  Gentle w/ prolonged holds   Prayer Stretches  15 reps - added to HEP, with extra extension/flexion at the end range. Scar Mass/Edema Control:     Retrograde/Soft Tissue x To hand and wrist to reduce edema and increase soft tissue elasticity   Scar Manipulation x To reduce scar tissue and increase elasticity  Cross friction with mini massager   - rolling therbar over scar site on table for decreasing scar tissue    Therapeutic Activity:     FM Tasks  -Opp pinching marbles to place into slot with wrist flexion  -Boading balls (clockwise and counter)  -Opp pinching with large pegs (in/out of board)  -card shuffling B hands for in hand manipulation        Strengthening:     L hand/wrist x x10 each Gripping, rolling, pinching alternating fingers, pulling, peg removal of 16 pegs with tweezers to improve pinch strength, wrist flexion, wrist extension, putty presses  3.0# Digi-Flex: 2 sets x15 sup, pron, and lateral.   - Mr. Magda Baker ( up & down =1) with 2# free weight x4  - 1# ball throwing and catching in LUE 2 sets x15.   - moderate resistance( red) clothes pins to increase pinch strength. Isometric wrist extension  Wrist extension with hands on resistance from therapist  - Pressing ball with both hands and holding for 5 seconds 2 sets x10, pressing against table 2 set x10, 2 sets x10 pressing against the wall. Wrist stability   True balance 4 mins to improve wrist stability/control. Forearm strengthening x - 2 # dumbbell, wrist extension, flexion.  RD/UD, pronation/supination x10 each  -moderate resistance (red ) therabar-15 reps, supination, pronation, twisting x15 each direction. Other:     Silicone x Silicone gel pad issued to decrease rigidity    Putty x Issued for home     Assessment/Comments: Pt is making Fair + progress toward stated plan of care. Pt tolerated session well with increased strengthening completed with no increased pain. Pt did report slight ulnar pain minimally and only with certain activities and not continuous. Pt continues to report increased ability to complete tasks with no pain.     -Rehab Potential: Good  -Requires OT Follow Up: Yes              Time In: 8:00 am            Time Out: 9:00 am     Treatment Charges: Mins Units   Modalities-paraffin 10 1   Ther Exercise 30 2   Manual Therapy 20 1   Thera Activities     ADL/Home Mgt      Neuro Re-education     Gait Training     Group Therapy     Non-Billable Service Time     Other     Total Time/Units 60 4     -Response to Treatment: She responded well to her treatment session. Pleased with how quickly she is progressing. Goal Progress:   GOALS (Long term same as Short term):  1) Patient will demonstrate good understanding of home program (exercises/activities/diagnosis/prognosis/goals) with good accuracy. Progressing  2) Patient will demonstrate increased active/passive range of motion of their L wrist to [facility] for ADL/IADL completion. Progressing  3) Patient will demonstrate increased /pinch strength of at least 10 / 2-5 pinch pounds of their L hand when appropriate. Progressing  4) Patient to report 100% compliance with their splint wear, care, and precautions if needed. Discharged. To use at night/during the day as needed however not using currently  5) Patient to demonstrate decreased guarding of their affected extremity from 100% to 20% or less. Met. Pt reports using her LUE daily at work.    6) Patient will be knowledgeable of edema control techniques as evident with decreases from Number Of Hemigard Strips Per Side: 1

## 2022-11-24 ENCOUNTER — HOSPITAL ENCOUNTER (EMERGENCY)
Age: 37
Discharge: HOME | End: 2022-11-25
Payer: MEDICAID

## 2022-11-24 ENCOUNTER — APPOINTMENT (OUTPATIENT)
Dept: CT IMAGING | Age: 37
End: 2022-11-24
Payer: MEDICAID

## 2022-11-24 ENCOUNTER — HOSPITAL ENCOUNTER (EMERGENCY)
Age: 37
Discharge: HOME | End: 2022-11-24
Attending: EMERGENCY MEDICINE
Payer: MEDICAID

## 2022-11-24 VITALS
OXYGEN SATURATION: 99 % | BODY MASS INDEX: 42.51 KG/M2 | WEIGHT: 240 LBS | RESPIRATION RATE: 18 BRPM | SYSTOLIC BLOOD PRESSURE: 102 MMHG | TEMPERATURE: 97.6 F | HEART RATE: 86 BPM | DIASTOLIC BLOOD PRESSURE: 54 MMHG

## 2022-11-24 VITALS
SYSTOLIC BLOOD PRESSURE: 139 MMHG | DIASTOLIC BLOOD PRESSURE: 91 MMHG | RESPIRATION RATE: 18 BRPM | OXYGEN SATURATION: 96 % | HEART RATE: 100 BPM | TEMPERATURE: 98.4 F

## 2022-11-24 DIAGNOSIS — S09.90XA CLOSED HEAD INJURY, INITIAL ENCOUNTER: Primary | ICD-10-CM

## 2022-11-24 PROCEDURE — 6370000000 HC RX 637 (ALT 250 FOR IP): Performed by: PHYSICIAN ASSISTANT

## 2022-11-24 PROCEDURE — 70450 CT HEAD/BRAIN W/O DYE: CPT

## 2022-11-24 PROCEDURE — 99284 EMERGENCY DEPT VISIT MOD MDM: CPT

## 2022-11-24 RX ORDER — ACETAMINOPHEN 325 MG/1
650 TABLET ORAL ONCE
Status: COMPLETED | OUTPATIENT
Start: 2022-11-24 | End: 2022-11-24

## 2022-11-24 RX ADMIN — ACETAMINOPHEN 650 MG: 325 TABLET ORAL at 20:41

## 2022-11-24 RX ADMIN — ACETAMINOPHEN 650 MG: 325 TABLET ORAL at 03:16

## 2022-11-24 ASSESSMENT — PAIN - FUNCTIONAL ASSESSMENT: PAIN_FUNCTIONAL_ASSESSMENT: 0-10

## 2022-11-24 ASSESSMENT — PAIN SCALES - GENERAL
PAINLEVEL_OUTOF10: 6
PAINLEVEL_OUTOF10: 6
PAINLEVEL_OUTOF10: 5

## 2022-11-24 ASSESSMENT — PAIN DESCRIPTION - DESCRIPTORS
DESCRIPTORS: ACHING
DESCRIPTORS: ACHING

## 2022-11-24 ASSESSMENT — PAIN DESCRIPTION - LOCATION
LOCATION: HEAD
LOCATION: HEAD
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
## 2022-11-25 RX ORDER — ONDANSETRON 4 MG/1
4 TABLET, ORALLY DISINTEGRATING ORAL EVERY 8 HOURS PRN
Qty: 10 TABLET | Refills: 0 | Status: SHIPPED | OUTPATIENT
Start: 2022-11-25
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
## 2023-02-13 ENCOUNTER — APPOINTMENT (OUTPATIENT)
Dept: GENERAL RADIOLOGY | Age: 38
End: 2023-02-13
Payer: MEDICAID

## 2023-02-13 ENCOUNTER — HOSPITAL ENCOUNTER (EMERGENCY)
Age: 38
Discharge: HOME | End: 2023-02-14
Attending: EMERGENCY MEDICINE
Payer: MEDICAID

## 2023-02-13 DIAGNOSIS — F41.9 ANXIETY: Primary | ICD-10-CM

## 2023-02-13 LAB
HCG, URINE, POC: NEGATIVE
Lab: NORMAL
NEGATIVE QC PASS/FAIL: NORMAL
POSITIVE QC PASS/FAIL: NORMAL

## 2023-02-13 PROCEDURE — 99284 EMERGENCY DEPT VISIT MOD MDM: CPT

## 2023-02-13 PROCEDURE — 71045 X-RAY EXAM CHEST 1 VIEW: CPT

## 2023-02-13 PROCEDURE — 80053 COMPREHEN METABOLIC PANEL: CPT

## 2023-02-13 PROCEDURE — 85378 FIBRIN DEGRADE SEMIQUANT: CPT

## 2023-02-13 PROCEDURE — 84484 ASSAY OF TROPONIN QUANT: CPT

## 2023-02-13 PROCEDURE — 2580000003 HC RX 258: Performed by: STUDENT IN AN ORGANIZED HEALTH CARE EDUCATION/TRAINING PROGRAM

## 2023-02-13 PROCEDURE — 85025 COMPLETE CBC W/AUTO DIFF WBC: CPT

## 2023-02-13 PROCEDURE — 6370000000 HC RX 637 (ALT 250 FOR IP): Performed by: STUDENT IN AN ORGANIZED HEALTH CARE EDUCATION/TRAINING PROGRAM

## 2023-02-13 RX ORDER — 0.9 % SODIUM CHLORIDE 0.9 %
1000 INTRAVENOUS SOLUTION INTRAVENOUS ONCE
Status: COMPLETED | OUTPATIENT
Start: 2023-02-13 | End: 2023-02-14

## 2023-02-13 RX ORDER — LORAZEPAM 0.5 MG/1
0.5 TABLET ORAL ONCE
Status: COMPLETED | OUTPATIENT
Start: 2023-02-14 | End: 2023-02-13

## 2023-02-13 RX ADMIN — SODIUM CHLORIDE 1000 ML: 9 INJECTION, SOLUTION INTRAVENOUS at 23:20

## 2023-02-13 RX ADMIN — LORAZEPAM 0.5 MG: 0.5 TABLET ORAL at 23:51

## 2023-02-13 ASSESSMENT — PAIN - FUNCTIONAL ASSESSMENT: PAIN_FUNCTIONAL_ASSESSMENT: 0-10

## 2023-02-13 ASSESSMENT — PAIN SCALES - GENERAL: PAINLEVEL_OUTOF10: 7

## 2023-02-13 ASSESSMENT — PAIN DESCRIPTION - LOCATION: LOCATION: CHEST

## 2023-02-14 VITALS
WEIGHT: 260 LBS | HEIGHT: 63 IN | RESPIRATION RATE: 19 BRPM | HEART RATE: 69 BPM | BODY MASS INDEX: 46.07 KG/M2 | DIASTOLIC BLOOD PRESSURE: 82 MMHG | TEMPERATURE: 98.1 F | OXYGEN SATURATION: 96 % | SYSTOLIC BLOOD PRESSURE: 125 MMHG

## 2023-02-14 LAB
ALBUMIN SERPL-MCNC: 4.2 G/DL (ref 3.5–5.2)
ALP BLD-CCNC: 104 U/L (ref 35–104)
ALT SERPL-CCNC: 50 U/L (ref 0–32)
ANION GAP SERPL CALCULATED.3IONS-SCNC: 11 MMOL/L (ref 7–16)
AST SERPL-CCNC: 61 U/L (ref 0–31)
BASOPHILS ABSOLUTE: 0.07 E9/L (ref 0–0.2)
BASOPHILS RELATIVE PERCENT: 0.8 % (ref 0–2)
BILIRUB SERPL-MCNC: 0.3 MG/DL (ref 0–1.2)
BUN BLDV-MCNC: 12 MG/DL (ref 6–20)
CALCIUM SERPL-MCNC: 9.5 MG/DL (ref 8.6–10.2)
CHLORIDE BLD-SCNC: 101 MMOL/L (ref 98–107)
CO2: 28 MMOL/L (ref 22–29)
CREAT SERPL-MCNC: 0.7 MG/DL (ref 0.5–1)
D DIMER: <200 NG/ML DDU
EOSINOPHILS ABSOLUTE: 0.43 E9/L (ref 0.05–0.5)
EOSINOPHILS RELATIVE PERCENT: 4.8 % (ref 0–6)
GFR SERPL CREATININE-BSD FRML MDRD: >60 ML/MIN/1.73
GLUCOSE BLD-MCNC: 94 MG/DL (ref 74–99)
HCT VFR BLD CALC: 43.4 % (ref 34–48)
HEMOGLOBIN: 13.6 G/DL (ref 11.5–15.5)
IMMATURE GRANULOCYTES #: 0.04 E9/L
IMMATURE GRANULOCYTES %: 0.4 % (ref 0–5)
LYMPHOCYTES ABSOLUTE: 2.86 E9/L (ref 1.5–4)
LYMPHOCYTES RELATIVE PERCENT: 31.9 % (ref 20–42)
MCH RBC QN AUTO: 25.9 PG (ref 26–35)
MCHC RBC AUTO-ENTMCNC: 31.3 % (ref 32–34.5)
MCV RBC AUTO: 82.7 FL (ref 80–99.9)
MONOCYTES ABSOLUTE: 0.62 E9/L (ref 0.1–0.95)
MONOCYTES RELATIVE PERCENT: 6.9 % (ref 2–12)
NEUTROPHILS ABSOLUTE: 4.94 E9/L (ref 1.8–7.3)
NEUTROPHILS RELATIVE PERCENT: 55.2 % (ref 43–80)
PDW BLD-RTO: 12.9 FL (ref 11.5–15)
PLATELET # BLD: 259 E9/L (ref 130–450)
PMV BLD AUTO: 11 FL (ref 7–12)
POTASSIUM REFLEX MAGNESIUM: 3.9 MMOL/L (ref 3.5–5)
RBC # BLD: 5.25 E12/L (ref 3.5–5.5)
SODIUM BLD-SCNC: 140 MMOL/L (ref 132–146)
TOTAL PROTEIN: 7.5 G/DL (ref 6.4–8.3)
TROPONIN, HIGH SENSITIVITY: <6 NG/L (ref 0–9)
WBC # BLD: 9 E9/L (ref 4.5–11.5)

## 2023-02-14 RX ORDER — HYDROXYZINE HYDROCHLORIDE 25 MG/1
25 TABLET, FILM COATED ORAL EVERY 8 HOURS PRN
Qty: 30 TABLET | Refills: 0 | Status: SHIPPED | OUTPATIENT
Start: 2023-02-14 | End: 2023-02-24

## 2023-02-14 NOTE — ED PROVIDER NOTES
[address] ENCOUNTER        Pt Name: Jaquelin Tavarez  MRN: [id number]  [place] 1985  Date of evaluation: 2/13/2023  Provider: Natividad Arzate DO  PCP: Victor Hugo Del Toro DO  Note Started: 2:28 AM EST 2/14/23    CHIEF COMPLAINT       Chief Complaint   Patient presents with    Panic Attack       HISTORY OF PRESENT ILLNESS: 1 or more Elements   History From: Patient    Limitations to history : None    Jaquelin Tavarez is a 40 y.o. female Past medical history of anxiety, depression as well as previous pulmonary embolism not currently on anticoagulation. Patient presents with chief complaint of increasing anxiety . Patient states that her mom passed away earlier this week and since that time she has had increased anxiety. Patient also endorsed some mild shortness of breath. States that symptoms have been moderate in severity intermittent since onset she denies any exacerbating relieving factors. Patient denies any similar episodes in the past.  Patient denies any fevers, chills, nausea, vomiting, abdominal pain, constipation or diarrhea. Nursing Notes were all reviewed and agreed with or any disagreements were addressed in the HPI. REVIEW OF SYSTEMS :           Positives and Pertinent negatives as per HPI.      SURGICAL HISTORY     Past Surgical History:   Procedure Laterality Date    TONSILLECTOMY      WRIST FRACTURE SURGERY Left 3/3/2021    LEFT DISTAL RADIUS OPEN REDUCTION INTERNAL FIXATION performed by Kg Leung DO at [facility]       Discharge Medication List as of 2/14/2023  1:04 AM        CONTINUE these medications which have NOT CHANGED    Details   ondansetron (ZOFRAN ODT) 4 MG disintegrating tablet Take 1 tablet by mouth every 8 hours as needed for Nausea or Vomiting, Disp-10 tablet, R-0Normal      ibuprofen (ADVIL;MOTRIN) 600 MG tablet Take 1 tablet by mouth 3 times daily as needed for Pain, Disp-30 tablet, R-0Print             ALLERGIES     Patient has no known allergies. FAMILYHISTORY     No family history on file. SOCIAL HISTORY       Social History     Tobacco Use    Smoking status: Never    Smokeless tobacco: Never   Substance Use Topics    Alcohol use: Yes     Comment: social    Drug use: No       SCREENINGS        [place] Coma Scale  Eye Opening: Spontaneous  Best Verbal Response: Oriented  Best Motor Response: Obeys commands  Samy Coma Scale Score: 15                CIWA Assessment  BP: 125/82  Heart Rate: 69           PHYSICAL EXAM  1 or more Elements     ED Triage Vitals [02/13/23 2238]   BP Temp Temp Source Heart Rate Resp SpO2 Height Weight   (!) 129/98 98.1 °F (36.7 °C) Oral 77 18 99 % 5' 3\" (1.6 m) 260 lb (117.9 kg)           Constitutional/General: Alert and oriented x3  Head: Normocephalic and atraumatic  Eyes: PERRL, EOMI, conjunctiva normal, sclera non icteric  ENT:  Oropharynx clear, handling secretions, no trismus, no asymmetry of the posterior oropharynx or uvular edema  Neck: Supple, full ROM, no stridor, no meningeal signs  Respiratory: Lungs clear to auscultation bilaterally, no wheezes, rales, or rhonchi. Not in respiratory distress  Cardiovascular:  Regular rate. Regular rhythm. No murmurs, no gallops, no rubs. 2+ distal pulses. Equal extremity pulses. Chest: No chest wall tenderness  GI:  Abdomen Soft, Non tender, Non distended. +BS. No rebound, guarding, or rigidity. No pulsatile masses. Musculoskeletal: Moves all extremities x 4. Warm and well perfused, no clubbing, no cyanosis, no edema. Capillary refill <3 seconds  Integument: skin warm and dry. No rashes.    Neurologic: GCS 15, no focal deficits, symmetric strength 5/5 in the upper and lower extremities bilaterally  Psychiatric: Normal Affect            DIAGNOSTIC RESULTS   LABS:    Labs Reviewed   CBC WITH AUTO DIFFERENTIAL - Abnormal; Notable for the following components:       Result Value    MCH 25.9 (*)     MCHC 31.3 (*)     All other components within normal limits   COMPREHENSIVE METABOLIC PANEL W/ REFLEX TO MG FOR LOW K - Abnormal; Notable for the following components:    ALT 50 (*)     AST 61 (*)     All other components within normal limits   TROPONIN   D-DIMER, QUANTITATIVE   POC PREGNANCY UR-QUAL       As interpreted by me, the above displayed labs are abnormal. All other labs obtained during this visit were within normal range or not returned as of this dictation. RADIOLOGY:   Non-plain film images such as CT, Ultrasound and MRI are read by the radiologist. Plain radiographic images are visualized and preliminarily interpreted by the ED Provider with the below findings:        Interpretation per the Radiologist below, if available at the time of this note:    XR CHEST PORTABLE   Final Result   No acute process. Stable exam.           XR CHEST PORTABLE    Result Date: 2/13/2023  EXAMINATION: ONE XRAY VIEW OF THE CHEST 2/13/2023 10:04 pm COMPARISON: Chest one view, 03/13/2022; left wrist series, 12/18/2018 HISTORY: ORDERING SYSTEM PROVIDED HISTORY: chest pain, SOB TECHNOLOGIST PROVIDED HISTORY: Reason for exam:->chest pain, SOB FINDINGS: Lines, tubes, and devices: None. Lungs and pleura: No focal consolidation. No pleural effusion. No pneumothorax. Cardiomediastinal silhouette: The heart size is normal. Bones and soft tissues: Unremarkable. No acute process. Stable exam.       No results found. PROCEDURES   Unless otherwise noted below, none       PAST MEDICAL HISTORY/Chronic Conditions Affecting Care      has a past medical history of Anxiety, Depression, and Pulmonary embolism ([facility] [address] (2004).      EMERGENCY DEPARTMENT COURSE    Vitals:    Vitals:    02/14/23 0020 02/14/23 0030 02/14/23 0040 02/14/23 0050   BP:  125/82     Pulse: 73 73 74 69   Resp: 24 24 18 19   Temp:       TempSrc:       SpO2: 94% 99% 98% 96%   Weight:       Height:           Patient was given the following medications:  Medications   0.9 % sodium chloride bolus (0 mLs IntraVENous Stopped 23 0053)   LORazepam (ATIVAN) tablet 0.5 mg (0.5 mg Oral Given 23 8271)           Is this patient to be included in the SEP-1 Core Measure due to severe sepsis or septic shock? No Exclusion criteria - the patient is NOT to be included for SEP-1 Core Measure due to: Infection is not suspected        Medical Decision Making/Differential Diagnosis:    CC/HPI Summary, Social Determinants of health, Records Reviewed, DDx, testing done/not done, ED Course, Reassessment, disposition considerations/shared decision making with patient, consults, disposition:      ED Course as of 23 ATTENDING PROVIDER ATTESTATION:     I have personally performed and/or participated in the history, exam, medical decision making, and procedures and agree with all pertinent clinical information unless otherwise noted. I have also reviewed and agree with the past medical, family and social history unless otherwise noted. I have discussed this patient in detail with the resident, and provided the instruction and education regarding patient here complaining of general chest tightness and trouble sleeping with anxiety for the last 2 to 3 days, stating her mother just  this last week and since then she has been emotionally upset. No leg pain or swelling. No recent traveling or surgeries. .  My findings/plan: Patient sitting in the bed in no distress, emotionally upset during conversation regarding her mother. Heart rate regular, lungs are clear and equal.  Abdomen soft and nontender. No pretibial edema or calf pain. No jaundice or icterus.        [NC]      ED Course User Index  [NC] Susan Lino DO        History from : Patient    Limitations to history : None    Chronic Conditions: Anxiety, depression, PE    CONSULTS: (Who and What was discussed)  None    Discussion with Other Profesionals : None    Social Determinants : None    Records Reviewed : None    CC/HPI Summary, DDx, ED Course, and Reassessment: Patient is a 40 old female past med history of anxiety, depression as well as previous PE. Patient presents with chief complaint of increased anxiety. Vital signs stable presentation. On physical exam regular in rhythm, lungs clear to auscultation bilaterally, abdomen soft nontender no rebound or guarding. Differential diagnosis includes anxiety, PE. Laboratory work obtained CBC unremarkable, CMP unremarkable, lipase test negative, troponin less than 6, D-dimer less than 200, chest x-ray obtained demonstrate no acute abnormalities. Patient given 1 L of IV fluids as well as 0.5 mg of p.o. Ativan. Repeat evaluation patient notes improvement in anxiety. Plans consistent with panic attack likely secondary to grief reaction from recent death of mother. Patient is stable for discharge home. Patient given prescription for Atarax. Patient instructed to follow-up with primary care doctor. If patient has any new worrisome symptoms she was instructed to return to emergency department for evaluation    Disposition Considerations (Tests not ordered but considered, Shared Decision Making, Pt Expectation of Test or Tx.): On repeat evaluation patient is resting company in bed she notes improvement in symptoms laboratory work reassuring patient stable for discharge home. FINAL IMPRESSION      1.  Anxiety          DISPOSITION/PLAN     DISPOSITION Decision To Discharge 02/14/2023 12:57:24 AM      PATIENT REFERRED TO:  Jung Gómez DO  [address] [address]  [phone]    Schedule an appointment as soon as possible for a visit       [address] [place] [address] Emergency Department  [facility] [address]  Go to         DISCHARGE MEDICATIONS:  Discharge Medication List as of 2/14/2023  1:04 AM        START taking these medications    Details   hydrOXYzine HCl (ATARAX) 25 MG tablet Take 1 tablet by mouth every 8 hours as needed for Anxiety, Disp-30 tablet, R-0Normal             DISCONTINUED MEDICATIONS:  Discharge Medication List as of 2/14/2023  1:04 AM                 (Please note that portions of this note were completed with a voice recognition program.  Efforts were made to edit the dictations but occasionally words are mis-transcribed.)    Burt Prado DO (electronically signed)           Burt Prado DO  Resident  02/14/23 0293

## 2023-02-23 ENCOUNTER — OFFICE VISIT (OUTPATIENT)
Dept: FAMILY MEDICINE CLINIC | Age: 38
End: 2023-02-23

## 2023-02-23 VITALS
BODY MASS INDEX: 47.84 KG/M2 | DIASTOLIC BLOOD PRESSURE: 60 MMHG | HEIGHT: 63 IN | SYSTOLIC BLOOD PRESSURE: 100 MMHG | WEIGHT: 270 LBS | TEMPERATURE: 97.3 F | HEART RATE: 65 BPM | OXYGEN SATURATION: 97 %

## 2023-02-23 DIAGNOSIS — R09.81 CONGESTED NOSE: ICD-10-CM

## 2023-02-23 DIAGNOSIS — F41.9 ANXIETY: Primary | ICD-10-CM

## 2023-02-23 DIAGNOSIS — J02.9 SORE THROAT: ICD-10-CM

## 2023-02-23 LAB — S PYO AG THROAT QL: NORMAL

## 2023-02-23 SDOH — ECONOMIC STABILITY: FOOD INSECURITY: WITHIN THE PAST 12 MONTHS, THE FOOD YOU BOUGHT JUST DIDN'T LAST AND YOU DIDN'T HAVE MONEY TO GET MORE.: NEVER TRUE

## 2023-02-23 SDOH — ECONOMIC STABILITY: HOUSING INSECURITY
IN THE LAST 12 MONTHS, WAS THERE A TIME WHEN YOU DID NOT HAVE A STEADY PLACE TO SLEEP OR SLEPT IN A SHELTER (INCLUDING NOW)?: NO

## 2023-02-23 SDOH — ECONOMIC STABILITY: FOOD INSECURITY: WITHIN THE PAST 12 MONTHS, YOU WORRIED THAT YOUR FOOD WOULD RUN OUT BEFORE YOU GOT MONEY TO BUY MORE.: NEVER TRUE

## 2023-02-23 SDOH — ECONOMIC STABILITY: INCOME INSECURITY: HOW HARD IS IT FOR YOU TO PAY FOR THE VERY BASICS LIKE FOOD, HOUSING, MEDICAL CARE, AND HEATING?: NOT HARD AT ALL

## 2023-02-23 ASSESSMENT — PATIENT HEALTH QUESTIONNAIRE - PHQ9
9. THOUGHTS THAT YOU WOULD BE BETTER OFF DEAD, OR OF HURTING YOURSELF: 0
1. LITTLE INTEREST OR PLEASURE IN DOING THINGS: 0
SUM OF ALL RESPONSES TO PHQ QUESTIONS 1-9: 4
SUM OF ALL RESPONSES TO PHQ QUESTIONS 1-9: 4
8. MOVING OR SPEAKING SO SLOWLY THAT OTHER PEOPLE COULD HAVE NOTICED. OR THE OPPOSITE, BEING SO FIGETY OR RESTLESS THAT YOU HAVE BEEN MOVING AROUND A LOT MORE THAN USUAL: 0
10. IF YOU CHECKED OFF ANY PROBLEMS, HOW DIFFICULT HAVE THESE PROBLEMS MADE IT FOR YOU TO DO YOUR WORK, TAKE CARE OF THINGS AT HOME, OR GET ALONG WITH OTHER PEOPLE: 0
2. FEELING DOWN, DEPRESSED OR HOPELESS: 1
4. FEELING TIRED OR HAVING LITTLE ENERGY: 1
SUM OF ALL RESPONSES TO PHQ QUESTIONS 1-9: 4
SUM OF ALL RESPONSES TO PHQ9 QUESTIONS 1 & 2: 1
7. TROUBLE CONCENTRATING ON THINGS, SUCH AS READING THE NEWSPAPER OR WATCHING TELEVISION: 0
6. FEELING BAD ABOUT YOURSELF - OR THAT YOU ARE A FAILURE OR HAVE LET YOURSELF OR YOUR FAMILY DOWN: 0
5. POOR APPETITE OR OVEREATING: 1
SUM OF ALL RESPONSES TO PHQ QUESTIONS 1-9: 4
3. TROUBLE FALLING OR STAYING ASLEEP: 1

## 2023-02-23 NOTE — PROGRESS NOTES
[facility] PRIMARY CARE  [address] [place] [id number]  Dept: [phone]  Dept Fax: [phone]     NAME: Flex Canchola        :  1985        MRN:  [id number]    Chief Complaint   Patient presents with    Follow-up     Er follow up anxiety     Other     Headache congestion       Subjective     History of Present Illness  Flex Canchola is a 40 y.o. female who presents today for routine follow up and medication refill. Congestion since this morning, but was also congested a couple weeks ago. 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 She is feeling much better currently in regards to the anxiety. Review of Systems  Please see HPI above. Comprehensive review of systems negative unless otherwise noted above. Past Medical Hx:  Past Medical History:   Diagnosis Date    Anxiety     Depression     Pulmonary embolism ([facility] [address]        Past Surgical Hx:  Past Surgical History:   Procedure Laterality Date    TONSILLECTOMY      WRIST FRACTURE SURGERY Left 3/3/2021    LEFT DISTAL RADIUS OPEN REDUCTION INTERNAL FIXATION performed by Taras Pascual DO at [facility] OR       Family Hx:  History reviewed. No pertinent family history. Social Hx:  Social History     Tobacco Use    Smoking status: Never    Smokeless tobacco: Never   Substance Use Topics    Alcohol use: Yes     Comment: social       Home Medications:  No current outpatient medications on file. No current facility-administered medications for this visit.         Allergies:  No Known Allergies    Objective     Vitals:    23 1532   BP: 100/60   Pulse: 65   Temp: 97.3 °F (36.3 °C)   TempSrc: Temporal   SpO2: 97%   Weight: 270 lb (122.5 kg)   Height: 5' 3\" (1.6 m)        Physical Exam  General: Awake, alert, and oriented to person, place, time, and purpose, appears stated age and cooperative, No acute distress  Head: Normocephalic, atraumatic  Eyes: conjunctivae/corneas clear, EOMI  Neck:  symmetrical, trachea midline  Back: symmetric, ROM normal, No CVA tenderness. Lungs: clear to auscultation bilaterally without wheezes, rales, or rhonchi  Heart: regular rate and rhythm, S1, S2 normal, no murmur, click, rub or gallop  Abdomen: soft, non-tender; bowel sounds normal; no masses,  no organomegaly  Extremities: atraumatic, no cyanosis, no edema  Skin: color, texture, turgor within normal limits  Neurologic:speech appropriate, moves all 4 extremities, normal muscle strength and tone, CN 2-12 grossly intact    Labs:  Lab Results   Component Value Date    WBC 9.0 02/13/2023    HGB 13.6 02/13/2023    HCT 43.4 02/13/2023     02/13/2023     02/13/2023    K 3.9 02/13/2023     02/13/2023    CREATININE 0.7 02/13/2023    BUN 12 02/13/2023    CO2 28 02/13/2023    GLUCOSE 94 02/13/2023    ALT 50 (H) 02/13/2023    AST 61 (H) 02/13/2023     No results found for: TSH  No results found for: TRIG  No results found for: HDL  No results found for: LDLCALC  No results found for: LABA1C  No results found for: INR, PROTIME   *All recent labs were reviewed. Please see electronic chart for a more comprehensive set of labs    Radiology:  XR CHEST PORTABLE    Result Date: 2/13/2023  EXAMINATION: ONE XRAY VIEW OF THE CHEST 2/13/2023 10:04 pm COMPARISON: Chest one view, 03/13/2022; left wrist series, 12/18/2018 HISTORY: ORDERING SYSTEM PROVIDED HISTORY: chest pain, SOB TECHNOLOGIST PROVIDED HISTORY: Reason for exam:->chest pain, SOB FINDINGS: Lines, tubes, and devices: None. Lungs and pleura: No focal consolidation. No pleural effusion. No pneumothorax. Cardiomediastinal silhouette: The heart size is normal. Bones and soft tissues: Unremarkable. No acute process.  Stable exam.       Assessment and Plan     Patient is a 40 y.o. female who presented to the office for follow up. Full problem list is as follows:  Patient Active Problem List   Diagnosis    Closed Colles' fracture of left radius    Closed fracture of left distal radius       Koffi Gunter was seen today for follow-up and other. Diagnoses and all orders for this visit:    Anxiety  - new problem, likely situational in relation to her mother's recent passing.   - she is doing better now and has only taken the hydroxyzine a couple times. - ED records, labs, and imaging reviewed    Congested nose  Sore throat  -     POCT rapid strep A  - strep testing was negative, symptoms likely viral in nature       Educational materials and/or home exercises printed for patient's review and were included in patient instructions on his/her After Visit Summary and given to patient at the end of visit. Counseled regarding above diagnosis, including possible risks and complications, especially if left uncontrolled. Counseled regarding the possible side effects, risks, benefits and alternatives to treatment; patient and/or guardian verbalizes understanding, agrees, feels comfortable with and wishes to proceed with above treatment plan. Advised patient to call Torri Jacobs new medication issues, and read all Rx info from pharmacy to assure aware of all possible risks and side effects of any medication before taking. Patient verbalizes understanding and agrees with above counseling, assessment and plan. All questions answered.     Seth Manner, DO

## 2024-01-11 NOTE — PROGRESS NOTES
PSYCHIATRIC INITIAL EVALUATION      CHIEF COMPLAINT:  \"I need back on medication.\"    HISTORY OF PRESENT ILLNESS: Saida Marks is a 38 y.o. female who presents for psychiatric evaluation at [facility]. Patient reports a past psychiatric history of anxiety and depression. Patient reports she started treating with a psychologist and counselor at 19-years-old. She reports previously being prescribed psychotropic medications, however, felt stable and stopped all medication on her own a few years ago. Patient reports that her mental health has progressively declined recently. She attempted to get into counseling again, but did not find her new therapist helpful and has since stopped attending sessions. Patient reports a history of being prescribed Paxil (unable to tolerate), Wellbutrin, and BuSpar. Patient would like to be prescribed psychotropic medications to help better manage symptoms of anxiety and depression. Current symptoms of depression include lack of interest, feeling unmotivated, poor sleep, difficulty concentrating, fatigue, and crying. Patient reports having a passive death wish in the past, but denies currently. Patient tearful throughout interview. Her biggest complaint is anxiety.  Symptoms of anxiety include panic attacks, intrusive thoughts, irritability, feeling easily overwhelmed, excessive worry, and racing thoughts. Patient reports panic attacks are triggered by high levels of uncontrolled anxiety and consist of periods of shortness of breath and heart palpitations. Patient reports sleeping poorly as she \"cannot shut her mind off.\" Patient's protective factors include her family and feeling scared of experiencing pain associated with self-harm. She tells me that she is grateful for her support system. Patient denies history of suicide attempts or self-injurious behavior. She is pleasant and cooperative throughout conversation. She displays good help seeking behaviors. She

## 2024-01-12 ENCOUNTER — OFFICE VISIT (OUTPATIENT)
Age: 39
End: 2024-01-12

## 2024-01-12 VITALS
DIASTOLIC BLOOD PRESSURE: 74 MMHG | SYSTOLIC BLOOD PRESSURE: 133 MMHG | HEART RATE: 77 BPM | WEIGHT: 280 LBS | TEMPERATURE: 96.5 F | BODY MASS INDEX: 49.6 KG/M2

## 2024-01-12 DIAGNOSIS — F33.1 MODERATE EPISODE OF RECURRENT MAJOR DEPRESSIVE DISORDER (HCC): ICD-10-CM

## 2024-01-12 DIAGNOSIS — F41.1 GAD (GENERALIZED ANXIETY DISORDER): Primary | ICD-10-CM

## 2024-01-12 RX ORDER — ESCITALOPRAM OXALATE 10 MG/1
10 TABLET ORAL DAILY
Qty: 30 TABLET | Refills: 0 | Status: SHIPPED | OUTPATIENT
Start: 2024-01-12 | End: 2024-02-11

## 2024-01-12 RX ORDER — HYDROXYZINE PAMOATE 50 MG/1
50 CAPSULE ORAL 3 TIMES DAILY PRN
Qty: 90 CAPSULE | Refills: 0 | Status: SHIPPED | OUTPATIENT
Start: 2024-01-12 | End: 2024-02-11

## 2024-01-12 RX ORDER — BUSPIRONE HYDROCHLORIDE 5 MG/1
5 TABLET ORAL 2 TIMES DAILY
Qty: 60 TABLET | Refills: 0 | Status: SHIPPED | OUTPATIENT
Start: 2024-01-12 | End: 2024-02-11

## 2024-02-08 NOTE — PROGRESS NOTES
PSYCHIATRY NOTE:    CC: \"I'm doing very good.\"    Subjective: Patient being seen at [facility] in follow-up for CHIKA and MDD. Patient started on Lexapro and Buspar, PRN Vistaril increased at last appointment. Met with patient to discuss progress with treatment.     Tolerating current medication regimen without issue, no side effects noted  Symptoms of anxiety and depression have gradually improved  Denies experiencing any panic attacks since last appointment  Still experiencing occasional symptoms of anxiety at work and racing thoughts at night  Reports irritability has improved  Has been feeling more motivated, going back to the gym  Still complaining of poor sleep   Reports friends have noticed an improvement in her mental health symptoms  Is going on a trip to [place] soon  Denies suicidal ideations, intent, or plan  Overall feels that she is gradually stabilizing    Mental Status Examination:  White female appears stated age. Pleasant and cooperative. Normal psychomotor activity, no agitation, retardation, or involuntary movements noted. Eye contact appropriate. Gait steady. Mood euthymic. Affect flexible. Speech clear. Thoughts organized. Thought content devoid of auditory or visual hallucinations. Patient does not appear overtly or covertly psychotic, paranoid, or manic. Patient denies suicidal or homicidal ideations, intent, or plan. Cognitive function appears at baseline and memory is intact. Fund of knowledge fair. Language use fair. Insight and judgment fair.     OARRS: Reviewed.    VITALS: There were no vitals filed for this visit.    LABS:   No visits with results within 2 Day(s) from this visit.   Latest known visit with results is:   Admission on 02/13/2023, Discharged on 02/14/2023   Component Date Value Ref Range Status    WBC 02/13/2023 9.0  4.5 - 11.5 E9/L Final    RBC 02/13/2023 5.25  3.50 - 5.50 E12/L Final    Hemoglobin 02/13/2023 13.6  11.5 - 15.5 g/dL Final    Hematocrit

## 2024-02-09 ENCOUNTER — OFFICE VISIT (OUTPATIENT)
Age: 39
End: 2024-02-09
Payer: MEDICAID

## 2024-02-09 VITALS
DIASTOLIC BLOOD PRESSURE: 80 MMHG | SYSTOLIC BLOOD PRESSURE: 135 MMHG | TEMPERATURE: 97.4 F | BODY MASS INDEX: 49.42 KG/M2 | HEART RATE: 80 BPM | WEIGHT: 279 LBS | OXYGEN SATURATION: 96 %

## 2024-02-09 DIAGNOSIS — F33.1 MODERATE EPISODE OF RECURRENT MAJOR DEPRESSIVE DISORDER (HCC): ICD-10-CM

## 2024-02-09 DIAGNOSIS — F41.1 GAD (GENERALIZED ANXIETY DISORDER): ICD-10-CM

## 2024-02-09 DIAGNOSIS — F33.0 MILD EPISODE OF RECURRENT MAJOR DEPRESSIVE DISORDER (HCC): Primary | ICD-10-CM

## 2024-02-09 PROCEDURE — 99214 OFFICE O/P EST MOD 30 MIN: CPT

## 2024-02-09 PROCEDURE — G8428 CUR MEDS NOT DOCUMENT: HCPCS

## 2024-02-09 PROCEDURE — G8484 FLU IMMUNIZE NO ADMIN: HCPCS

## 2024-02-09 PROCEDURE — G8417 CALC BMI ABV UP PARAM F/U: HCPCS

## 2024-02-09 PROCEDURE — 1036F TOBACCO NON-USER: CPT

## 2024-02-09 RX ORDER — ESCITALOPRAM OXALATE 10 MG/1
10 TABLET ORAL DAILY
Qty: 30 TABLET | Refills: 0 | Status: SHIPPED | OUTPATIENT
Start: 2024-02-09 | End: 2024-03-10

## 2024-02-09 RX ORDER — BUSPIRONE HYDROCHLORIDE 10 MG/1
10 TABLET ORAL 2 TIMES DAILY
Qty: 60 TABLET | Refills: 0 | Status: SHIPPED | OUTPATIENT
Start: 2024-02-09 | End: 2024-03-10

## 2024-03-07 NOTE — PROGRESS NOTES
PSYCHIATRY NOTE:    CC: \"I'm doing a lot better than when I started here.\"    Subjective: Patient being seen at [facility] in follow-up for CHIKA and MDD. Met with patient to discuss progress with treatment.     Excited for [place] trip next week   Still looking to find a counselor   Complaining of feeling \"on edge\"   Denies experiencing any panic attacks or physical symptoms of anxiety   Rating anxiety and depression a 5/10  Only utilizing PRN Vistaril at bedtime, encouraged to use during the day   Experiencing vivid and bizarre dreams   Recently experiencing more situational stressors since last appointment   Denies suicidal ideations, intent, or plan     Mental Status Examination:  White female appears stated age. Pleasant and cooperative. Normal psychomotor activity, no agitation, retardation, or involuntary movements noted. Eye contact appropriate. Gait steady. Stated mood is \"on edge.\" Affect flexible. Speech clear. Thoughts organized. Thought content devoid of auditory or visual hallucinations. Patient does not appear overtly or covertly psychotic, paranoid, or manic. Patient denies suicidal or homicidal ideations, intent, or plan. Cognitive function appears at baseline and memory is intact. Fund of knowledge fair. Language use fair. Insight and judgment fair.     OARRS: Reviewed. 000.    VITALS: There were no vitals filed for this visit.    LABS:   No visits with results within 2 Day(s) from this visit.   Latest known visit with results is:   Admission on 02/13/2023, Discharged on 02/14/2023   Component Date Value Ref Range Status    WBC 02/13/2023 9.0  4.5 - 11.5 E9/L Final    RBC 02/13/2023 5.25  3.50 - 5.50 E12/L Final    Hemoglobin 02/13/2023 13.6  11.5 - 15.5 g/dL Final    Hematocrit 02/13/2023 43.4  34.0 - 48.0 % Final    MCV 02/13/2023 82.7  80.0 - 99.9 fL Final    MCH 02/13/2023 25.9 (L)  26.0 - 35.0 pg Final    MCHC 02/13/2023 31.3 (L)  32.0 - 34.5 % Final    RDW 02/13/2023 12.9  11.5

## 2024-03-08 ENCOUNTER — OFFICE VISIT (OUTPATIENT)
Age: 39
End: 2024-03-08
Payer: MEDICAID

## 2024-03-08 VITALS
DIASTOLIC BLOOD PRESSURE: 88 MMHG | OXYGEN SATURATION: 97 % | TEMPERATURE: 98 F | HEART RATE: 86 BPM | SYSTOLIC BLOOD PRESSURE: 155 MMHG

## 2024-03-08 DIAGNOSIS — F33.1 MODERATE EPISODE OF RECURRENT MAJOR DEPRESSIVE DISORDER (HCC): ICD-10-CM

## 2024-03-08 DIAGNOSIS — F41.1 GAD (GENERALIZED ANXIETY DISORDER): ICD-10-CM

## 2024-03-08 PROCEDURE — 1036F TOBACCO NON-USER: CPT

## 2024-03-08 PROCEDURE — 99214 OFFICE O/P EST MOD 30 MIN: CPT

## 2024-03-08 PROCEDURE — G8417 CALC BMI ABV UP PARAM F/U: HCPCS

## 2024-03-08 PROCEDURE — G8484 FLU IMMUNIZE NO ADMIN: HCPCS

## 2024-03-08 PROCEDURE — G8428 CUR MEDS NOT DOCUMENT: HCPCS

## 2024-03-08 RX ORDER — ESCITALOPRAM OXALATE 20 MG/1
20 TABLET ORAL EVERY MORNING
Qty: 30 TABLET | Refills: 0 | Status: SHIPPED | OUTPATIENT
Start: 2024-03-08 | End: 2024-04-07

## 2024-03-08 RX ORDER — BUSPIRONE HYDROCHLORIDE 15 MG/1
15 TABLET ORAL 2 TIMES DAILY
Qty: 60 TABLET | Refills: 0 | Status: SHIPPED | OUTPATIENT
Start: 2024-03-08 | End: 2024-04-07

## 2024-04-01 ENCOUNTER — HOSPITAL ENCOUNTER (EMERGENCY)
Age: 39
Discharge: HOME | End: 2024-04-01
Attending: STUDENT IN AN ORGANIZED HEALTH CARE EDUCATION/TRAINING PROGRAM
Payer: MEDICAID

## 2024-04-01 VITALS
OXYGEN SATURATION: 98 % | HEART RATE: 73 BPM | DIASTOLIC BLOOD PRESSURE: 56 MMHG | TEMPERATURE: 98 F | WEIGHT: 240 LBS | RESPIRATION RATE: 18 BRPM | BODY MASS INDEX: 42.51 KG/M2 | SYSTOLIC BLOOD PRESSURE: 122 MMHG

## 2024-04-01 DIAGNOSIS — J02.0 STREPTOCOCCAL SORE THROAT: Primary | ICD-10-CM

## 2024-04-01 LAB
ANION GAP SERPL CALCULATED.3IONS-SCNC: 10 MMOL/L (ref 7–16)
BASOPHILS # BLD: 0.05 K/UL (ref 0–0.2)
BASOPHILS NFR BLD: 0 % (ref 0–2)
BUN SERPL-MCNC: 13 MG/DL (ref 6–20)
CALCIUM SERPL-MCNC: 9.2 MG/DL (ref 8.6–10.2)
CHLORIDE SERPL-SCNC: 99 MMOL/L (ref 98–107)
CO2 SERPL-SCNC: 26 MMOL/L (ref 22–29)
CREAT SERPL-MCNC: 0.8 MG/DL (ref 0.5–1)
EOSINOPHIL # BLD: 0.17 K/UL (ref 0.05–0.5)
EOSINOPHILS RELATIVE PERCENT: 1 % (ref 0–6)
ERYTHROCYTE [DISTWIDTH] IN BLOOD BY AUTOMATED COUNT: 13.9 % (ref 11.5–15)
GFR SERPL CREATININE-BSD FRML MDRD: >90 ML/MIN/1.73M2
GLUCOSE SERPL-MCNC: 97 MG/DL (ref 74–99)
HCG, URINE, POC: NEGATIVE
HCT VFR BLD AUTO: 44.1 % (ref 34–48)
HGB BLD-MCNC: 13.9 G/DL (ref 11.5–15.5)
IMM GRANULOCYTES # BLD AUTO: 0.08 K/UL (ref 0–0.58)
IMM GRANULOCYTES NFR BLD: 1 % (ref 0–5)
INFLUENZA A BY PCR: NOT DETECTED
INFLUENZA B BY PCR: NOT DETECTED
LYMPHOCYTES NFR BLD: 1.79 K/UL (ref 1.5–4)
LYMPHOCYTES RELATIVE PERCENT: 12 % (ref 20–42)
Lab: NORMAL
MCH RBC QN AUTO: 25.5 PG (ref 26–35)
MCHC RBC AUTO-ENTMCNC: 31.5 G/DL (ref 32–34.5)
MCV RBC AUTO: 80.8 FL (ref 80–99.9)
MONOCYTES NFR BLD: 1.01 K/UL (ref 0.1–0.95)
MONOCYTES NFR BLD: 7 % (ref 2–12)
NEGATIVE QC PASS/FAIL: NORMAL
NEUTROPHILS NFR BLD: 80 % (ref 43–80)
NEUTS SEG NFR BLD: 12.05 K/UL (ref 1.8–7.3)
PLATELET # BLD AUTO: 235 K/UL (ref 130–450)
PMV BLD AUTO: 10.3 FL (ref 7–12)
POSITIVE QC PASS/FAIL: NORMAL
POTASSIUM SERPL-SCNC: 3.8 MMOL/L (ref 3.5–5)
RBC # BLD AUTO: 5.46 M/UL (ref 3.5–5.5)
SODIUM SERPL-SCNC: 135 MMOL/L (ref 132–146)
SPECIMEN SOURCE: ABNORMAL
STREP A, MOLECULAR: POSITIVE
WBC OTHER # BLD: 15.2 K/UL (ref 4.5–11.5)

## 2024-04-01 PROCEDURE — 87651 STREP A DNA AMP PROBE: CPT

## 2024-04-01 PROCEDURE — 85025 COMPLETE CBC W/AUTO DIFF WBC: CPT

## 2024-04-01 PROCEDURE — 2580000003 HC RX 258: Performed by: STUDENT IN AN ORGANIZED HEALTH CARE EDUCATION/TRAINING PROGRAM

## 2024-04-01 PROCEDURE — 96375 TX/PRO/DX INJ NEW DRUG ADDON: CPT

## 2024-04-01 PROCEDURE — 6360000002 HC RX W HCPCS: Performed by: STUDENT IN AN ORGANIZED HEALTH CARE EDUCATION/TRAINING PROGRAM

## 2024-04-01 PROCEDURE — 87502 INFLUENZA DNA AMP PROBE: CPT

## 2024-04-01 PROCEDURE — 96374 THER/PROPH/DIAG INJ IV PUSH: CPT

## 2024-04-01 PROCEDURE — 6370000000 HC RX 637 (ALT 250 FOR IP): Performed by: STUDENT IN AN ORGANIZED HEALTH CARE EDUCATION/TRAINING PROGRAM

## 2024-04-01 PROCEDURE — 99284 EMERGENCY DEPT VISIT MOD MDM: CPT

## 2024-04-01 PROCEDURE — 80048 BASIC METABOLIC PNL TOTAL CA: CPT

## 2024-04-01 RX ORDER — GUAIFENESIN 1200 MG/1
1 TABLET, EXTENDED RELEASE ORAL 2 TIMES DAILY PRN
Qty: 14 TABLET | Refills: 0 | Status: SHIPPED | OUTPATIENT
Start: 2024-04-01 | End: 2024-04-08

## 2024-04-01 RX ORDER — ONDANSETRON 4 MG/1
4 TABLET, ORALLY DISINTEGRATING ORAL 3 TIMES DAILY PRN
Qty: 21 TABLET | Refills: 0 | Status: SHIPPED | OUTPATIENT
Start: 2024-04-01

## 2024-04-01 RX ORDER — 0.9 % SODIUM CHLORIDE 0.9 %
2000 INTRAVENOUS SOLUTION INTRAVENOUS ONCE
Status: COMPLETED | OUTPATIENT
Start: 2024-04-01 | End: 2024-04-01

## 2024-04-01 RX ORDER — AMOXICILLIN 250 MG/1
500 CAPSULE ORAL ONCE
Status: COMPLETED | OUTPATIENT
Start: 2024-04-01 | End: 2024-04-01

## 2024-04-01 RX ORDER — ONDANSETRON 2 MG/ML
4 INJECTION INTRAMUSCULAR; INTRAVENOUS ONCE
Status: COMPLETED | OUTPATIENT
Start: 2024-04-01 | End: 2024-04-01

## 2024-04-01 RX ORDER — KETOROLAC TROMETHAMINE 15 MG/ML
15 INJECTION, SOLUTION INTRAMUSCULAR; INTRAVENOUS ONCE
Status: COMPLETED | OUTPATIENT
Start: 2024-04-01 | End: 2024-04-01

## 2024-04-01 RX ORDER — AMOXICILLIN 500 MG/1
500 CAPSULE ORAL 2 TIMES DAILY
Qty: 20 CAPSULE | Refills: 0 | Status: SHIPPED | OUTPATIENT
Start: 2024-04-01 | End: 2024-04-11

## 2024-04-01 RX ADMIN — SODIUM CHLORIDE 2000 ML: 9 INJECTION, SOLUTION INTRAVENOUS at 11:32

## 2024-04-01 RX ADMIN — KETOROLAC TROMETHAMINE 15 MG: 15 INJECTION, SOLUTION INTRAMUSCULAR; INTRAVENOUS at 11:33

## 2024-04-01 RX ADMIN — AMOXICILLIN 500 MG: 250 CAPSULE ORAL at 12:39

## 2024-04-01 RX ADMIN — ONDANSETRON 4 MG: 2 INJECTION INTRAMUSCULAR; INTRAVENOUS at 11:33

## 2024-04-01 ASSESSMENT — ENCOUNTER SYMPTOMS
DIARRHEA: 0
VOMITING: 1
SORE THROAT: 1
SHORTNESS OF BREATH: 0
ABDOMINAL PAIN: 0
NAUSEA: 1

## 2024-04-01 ASSESSMENT — PAIN SCALES - GENERAL
PAINLEVEL_OUTOF10: 5
PAINLEVEL_OUTOF10: 10

## 2024-04-01 ASSESSMENT — LIFESTYLE VARIABLES
HOW MANY STANDARD DRINKS CONTAINING ALCOHOL DO YOU HAVE ON A TYPICAL DAY: PATIENT DOES NOT DRINK
HOW OFTEN DO YOU HAVE A DRINK CONTAINING ALCOHOL: NEVER

## 2024-04-01 ASSESSMENT — PAIN DESCRIPTION - LOCATION: LOCATION: HEAD

## 2024-04-01 ASSESSMENT — PAIN - FUNCTIONAL ASSESSMENT: PAIN_FUNCTIONAL_ASSESSMENT: 0-10

## 2024-04-01 ASSESSMENT — PAIN DESCRIPTION - DESCRIPTORS: DESCRIPTORS: DISCOMFORT

## 2024-04-01 NOTE — ED PROVIDER NOTES
[facility] EMERGENCY DEPARTMENT  EMERGENCY DEPARTMENT ENCOUNTER        Pt Name: Saida Marks  MRN: [id number]  Birthdate 1985  Date of evaluation: 4/1/2024  Provider: Magda Pyle MD  PCP: Camilla Sprague DO  Note Started: 11:06 AM EDT 4/1/24    HPI  39 y.o. female presenting for emesis.  Patient states she developed sore throat and postnasal drainage starting Saturday night.  Last afternoon, she developed chills, body aches and temp of 102.7F.  She had emesis all night.  She not take any meds prior to arrival this morning.  She complains of headache.  She took a home COVID test which was negative.  She denies any chest pain, shortness breath, cough, abdominal pain, or dysuria.        --------------------------------------------- PAST HISTORY ---------------------------------------------  Past Medical History:  has a past medical history of Anxiety, Depression, and Pulmonary embolism (HCC).    Past Surgical History:  has a past surgical history that includes Tonsillectomy and Wrist fracture surgery (Left, 3/3/2021).    Social History:  reports that she has never smoked. She has never used smokeless tobacco. She reports current alcohol use. She reports that she does not use drugs.    Family History: family history is not on file.     The patient’s home medications have been reviewed.    Allergies: Patient has no known allergies.      Review of Systems   Constitutional:  Positive for chills, fatigue and fever.   HENT:  Positive for congestion and sore throat.    Respiratory:  Negative for shortness of breath.    Cardiovascular:  Negative for chest pain.   Gastrointestinal:  Positive for nausea and vomiting. Negative for abdominal pain and diarrhea.   Genitourinary:  Negative for dysuria.   Musculoskeletal:  Positive for myalgias.   Neurological:  Positive for headaches.        Physical Exam  Constitutional:       General: She is not in acute distress.     Appearance:

## 2024-04-03 NOTE — PROGRESS NOTES
Final    Source 04/01/2024 .THROAT SWAB   Final    Strep A, Molecular 04/01/2024 POSITIVE (A)  NEGATIVE Final    Comment:       Positive for Strep A Nucleic acid.      Influenza A by PCR 04/01/2024 Not Detected  Not Detected Final    Methodology: Isothermal Nucleic Acid Amplification    Influenza B by PCR 04/01/2024 Not Detected  Not Detected Final    Methodology: Isothermal Nucleic Acid Amplification    HCG, Urine, POC 04/01/2024 Negative  Negative Final    Lot Number 04/01/2024 [id number]   Final    Positive QC Pass/Fail 04/01/2024 Pass   Final    Negative QC Pass/Fail 04/01/2024 Pass   Final           MEDICATIONS:         Lexapro 10 mg daily (taper then discontinue)     Wellbutrin  mg daily (NEW)                                      Buspar 15 mg BID (continue)                                       Vistaril 50 mg TID PRN (continue)      ASSESSMENT:          Generalized Anxiety Disorder                                      Moderate Episode of Recurrent Major Depressive Disorder      PLAN: Therapeutic communication offered. Patient complaining of vivid dreams since increasing dosage of Lexapro and is requesting to transition back to Wellbutrin as she tolerated this medication well in the past. Potential side effects reviewed. Encouraged patient to start counseling. Will continue to adjust treatment as needed. RTC in 1 month and patient to call sooner if needed.                          NOTE:  This report was transcribed using voice recognition software.  Every effort was made to ensure accuracy; however, inadvertent computerized transcription errors may be present.     20 minutes was spent providing face-to-face patient care, including:  and coordinating care, reviewing the chart, labs, and diagnostics, as well as medical decision making.     Electronically signed by HEVER Arthur CNP on 4/5/2024 at 9:02 AM

## 2024-04-05 ENCOUNTER — OFFICE VISIT (OUTPATIENT)
Age: 39
End: 2024-04-05
Payer: MEDICAID

## 2024-04-05 DIAGNOSIS — F41.1 GAD (GENERALIZED ANXIETY DISORDER): ICD-10-CM

## 2024-04-05 DIAGNOSIS — F33.1 MODERATE EPISODE OF RECURRENT MAJOR DEPRESSIVE DISORDER (HCC): ICD-10-CM

## 2024-04-05 PROCEDURE — G8417 CALC BMI ABV UP PARAM F/U: HCPCS

## 2024-04-05 PROCEDURE — 1036F TOBACCO NON-USER: CPT

## 2024-04-05 PROCEDURE — G8428 CUR MEDS NOT DOCUMENT: HCPCS

## 2024-04-05 PROCEDURE — 99213 OFFICE O/P EST LOW 20 MIN: CPT

## 2024-04-05 RX ORDER — BUPROPION HYDROCHLORIDE 150 MG/1
150 TABLET ORAL EVERY MORNING
Qty: 30 TABLET | Refills: 0 | Status: SHIPPED | OUTPATIENT
Start: 2024-04-05 | End: 2024-05-05

## 2024-04-05 RX ORDER — BUSPIRONE HYDROCHLORIDE 15 MG/1
15 TABLET ORAL 2 TIMES DAILY
Qty: 60 TABLET | Refills: 0 | Status: SHIPPED | OUTPATIENT
Start: 2024-04-05 | End: 2024-05-05

## 2024-04-05 RX ORDER — ESCITALOPRAM OXALATE 10 MG/1
10 TABLET ORAL EVERY MORNING
Qty: 14 TABLET | Refills: 0 | Status: SHIPPED | OUTPATIENT
Start: 2024-04-05 | End: 2024-04-19

## (undated) DEVICE — SPLINT CAST W4XL15IN GRN STRENGTH PLSTR OF PARIS FAST SET

## (undated) DEVICE — BANDAGE,ELASTIC,ESMARK,STERILE,4"X9',LF: Brand: MEDLINE

## (undated) DEVICE — SURGICAL PROCEDURE PACK BASIC

## (undated) DEVICE — BIT DRL L100MM DIA2MM ST QUIK CPL NONRADIOPAQUE W/O STP

## (undated) DEVICE — DRESSING,GAUZE,XEROFORM,CURAD,5"X9",ST: Brand: CURAD

## (undated) DEVICE — 3M™ STERI-DRAPE™ U-DRAPE 1015: Brand: STERI-DRAPE™

## (undated) DEVICE — PADDING,UNDERCAST,COTTON, 3X4YD STERILE: Brand: MEDLINE

## (undated) DEVICE — INTENDED FOR TISSUE SEPARATION, AND OTHER PROCEDURES THAT REQUIRE A SHARP SURGICAL BLADE TO PUNCTURE OR CUT.: Brand: BARD-PARKER ® STAINLESS STEEL BLADES

## (undated) DEVICE — BIT DRL L100MM DIA2.7MM QUIK CPL FOR SM FRAG SYS

## (undated) DEVICE — GLOVE ORANGE PI 8   MSG9080

## (undated) DEVICE — PATIENT RETURN ELECTRODE, SINGLE-USE, CONTACT QUALITY MONITORING, ADULT, WITH 9FT CORD, FOR PATIENTS WEIGING OVER 33LBS. (15KG): Brand: MEGADYNE

## (undated) DEVICE — ZIMMER® STERILE DISPOSABLE TOURNIQUET CUFF WITH PROTECTIVE SLEEVE AND PLC, DUAL PORT, SINGLE BLADDER, 18 IN. (46 CM)

## (undated) DEVICE — BNDG,ELSTC,MATRIX,STRL,3"X5YD,LF,HOOK&LP: Brand: MEDLINE

## (undated) DEVICE — Z DISCONTINUED USE 2275686 GLOVE SURG SZ 8 L12IN FNGR THK13MIL WHT ISOLEX POLYISOPRENE

## (undated) DEVICE — APPLICATOR MEDICATED 26 CC SOLUTION HI LT ORNG CHLORAPREP

## (undated) DEVICE — TUBING SUCT 12FR MAL ALUM SHFT FN CAP VENT UNIV CONN W/ OBT

## (undated) DEVICE — GOWN,BREATHABLE SLV,AURORA,XLG,STRL: Brand: MEDLINE

## (undated) DEVICE — PADDING,UNDERCAST,COTTON, 4"X4YD STERILE: Brand: MEDLINE

## (undated) DEVICE — DRAPE C ARM W41XL74IN UNIV MOB W RUBBERBAND CLP

## (undated) DEVICE — DRIP REDUCTION MANIFOLD

## (undated) DEVICE — SET ORTHO STD STORTSTD2

## (undated) DEVICE — CLOTH SURG PREP PREOPERATIVE CHLORHEXIDINE GLUC 2% READYPREP

## (undated) DEVICE — DRILL SYSTEM 7

## (undated) DEVICE — DRAPE,HAND,STERILE: Brand: MEDLINE